# Patient Record
Sex: MALE | Race: WHITE | NOT HISPANIC OR LATINO | Employment: FULL TIME | URBAN - METROPOLITAN AREA
[De-identification: names, ages, dates, MRNs, and addresses within clinical notes are randomized per-mention and may not be internally consistent; named-entity substitution may affect disease eponyms.]

---

## 2023-03-30 LAB
ALANINE AMINOTRANSFERASE (SGPT) (U/L) IN SER/PLAS: 11 U/L (ref 10–52)
ALBUMIN (G/DL) IN SER/PLAS: 4.7 G/DL (ref 3.4–5)
ALKALINE PHOSPHATASE (U/L) IN SER/PLAS: 64 U/L (ref 33–120)
ANION GAP IN SER/PLAS: 9 MMOL/L (ref 10–20)
ASPARTATE AMINOTRANSFERASE (SGOT) (U/L) IN SER/PLAS: 11 U/L (ref 9–39)
BASOPHILS (10*3/UL) IN BLOOD BY AUTOMATED COUNT: 0.05 X10E9/L (ref 0–0.1)
BASOPHILS/100 LEUKOCYTES IN BLOOD BY AUTOMATED COUNT: 1 % (ref 0–2)
BILIRUBIN TOTAL (MG/DL) IN SER/PLAS: 0.8 MG/DL (ref 0–1.2)
C REACTIVE PROTEIN (MG/L) IN SER/PLAS: 0.2 MG/DL
CALCIUM (MG/DL) IN SER/PLAS: 9.5 MG/DL (ref 8.6–10.3)
CARBON DIOXIDE, TOTAL (MMOL/L) IN SER/PLAS: 31 MMOL/L (ref 21–32)
CHLORIDE (MMOL/L) IN SER/PLAS: 99 MMOL/L (ref 98–107)
CREATININE (MG/DL) IN SER/PLAS: 0.87 MG/DL (ref 0.5–1.3)
EOSINOPHILS (10*3/UL) IN BLOOD BY AUTOMATED COUNT: 0.05 X10E9/L (ref 0–0.7)
EOSINOPHILS/100 LEUKOCYTES IN BLOOD BY AUTOMATED COUNT: 1 % (ref 0–6)
ERYTHROCYTE DISTRIBUTION WIDTH (RATIO) BY AUTOMATED COUNT: 13.1 % (ref 11.5–14.5)
ERYTHROCYTE MEAN CORPUSCULAR HEMOGLOBIN CONCENTRATION (G/DL) BY AUTOMATED: 34.7 G/DL (ref 32–36)
ERYTHROCYTE MEAN CORPUSCULAR VOLUME (FL) BY AUTOMATED COUNT: 92 FL (ref 80–100)
ERYTHROCYTES (10*6/UL) IN BLOOD BY AUTOMATED COUNT: 4.68 X10E12/L (ref 4.5–5.9)
GFR MALE: >90 ML/MIN/1.73M2
GLUCOSE (MG/DL) IN SER/PLAS: 89 MG/DL (ref 74–99)
HEMATOCRIT (%) IN BLOOD BY AUTOMATED COUNT: 43 % (ref 41–52)
HEMOGLOBIN (G/DL) IN BLOOD: 14.9 G/DL (ref 13.5–17.5)
IMMATURE GRANULOCYTES/100 LEUKOCYTES IN BLOOD BY AUTOMATED COUNT: 0.2 % (ref 0–0.9)
LEUKOCYTES (10*3/UL) IN BLOOD BY AUTOMATED COUNT: 5.2 X10E9/L (ref 4.4–11.3)
LYMPHOCYTES (10*3/UL) IN BLOOD BY AUTOMATED COUNT: 1.65 X10E9/L (ref 1.2–4.8)
LYMPHOCYTES/100 LEUKOCYTES IN BLOOD BY AUTOMATED COUNT: 31.9 % (ref 13–44)
MONOCYTES (10*3/UL) IN BLOOD BY AUTOMATED COUNT: 0.6 X10E9/L (ref 0.1–1)
MONOCYTES/100 LEUKOCYTES IN BLOOD BY AUTOMATED COUNT: 11.6 % (ref 2–10)
NEUTROPHILS (10*3/UL) IN BLOOD BY AUTOMATED COUNT: 2.81 X10E9/L (ref 1.2–7.7)
NEUTROPHILS/100 LEUKOCYTES IN BLOOD BY AUTOMATED COUNT: 54.3 % (ref 40–80)
NRBC (PER 100 WBCS) BY AUTOMATED COUNT: 0 /100 WBC (ref 0–0)
PLATELETS (10*3/UL) IN BLOOD AUTOMATED COUNT: 190 X10E9/L (ref 150–450)
POTASSIUM (MMOL/L) IN SER/PLAS: 4.1 MMOL/L (ref 3.5–5.3)
PROTEIN TOTAL: 8.3 G/DL (ref 6.4–8.2)
SODIUM (MMOL/L) IN SER/PLAS: 135 MMOL/L (ref 136–145)
UREA NITROGEN (MG/DL) IN SER/PLAS: 17 MG/DL (ref 6–23)

## 2023-04-01 LAB
C. DIFFICILE TOXIN, PCR: NOT DETECTED
CAMPYLOBACTER GP: NOT DETECTED
NOROVIRUS GI/GII: NOT DETECTED
ROTAVIRUS A: NOT DETECTED
SALMONELLA SP.: NOT DETECTED
SHIGA TOXIN 1: NOT DETECTED
SHIGA TOXIN 2: NOT DETECTED
SHIGELLA SP.: NOT DETECTED
VIBRIO GRP.: NOT DETECTED
YERSINIA ENTEROCOLITICA: NOT DETECTED

## 2023-04-05 LAB — CALPROTECTIN, STOOL: 2270 UG/G

## 2023-05-17 ENCOUNTER — HOSPITAL ENCOUNTER (OUTPATIENT)
Dept: DATA CONVERSION | Facility: HOSPITAL | Age: 53
End: 2023-05-17
Attending: INTERNAL MEDICINE | Admitting: INTERNAL MEDICINE
Payer: COMMERCIAL

## 2023-05-17 DIAGNOSIS — Z98.0 INTESTINAL BYPASS AND ANASTOMOSIS STATUS: ICD-10-CM

## 2023-05-17 DIAGNOSIS — K50.90 CROHN'S DISEASE, UNSPECIFIED, WITHOUT COMPLICATIONS (MULTI): ICD-10-CM

## 2023-05-17 DIAGNOSIS — K64.4 RESIDUAL HEMORRHOIDAL SKIN TAGS: ICD-10-CM

## 2023-05-17 DIAGNOSIS — K50.80 CROHN'S DISEASE OF BOTH SMALL AND LARGE INTESTINE WITHOUT COMPLICATIONS (MULTI): ICD-10-CM

## 2023-05-30 LAB
COMPLETE PATHOLOGY REPORT: NORMAL
CONVERTED CLINICAL DIAGNOSIS-HISTORY: NORMAL
CONVERTED FINAL DIAGNOSIS: NORMAL
CONVERTED FINAL REPORT PDF LINK TO COPY AND PASTE: NORMAL
CONVERTED GROSS DESCRIPTION: NORMAL
CONVERTED MICROSCOPIC DESCRIPTION: NORMAL

## 2023-06-01 LAB
HEPATITIS B VIRUS CORE AB (PRESENCE) IN SER/PLAS BY IMM: NONREACTIVE
HEPATITIS B VIRUS SURFACE AB (MIU/ML) IN SERUM: <3.1 MIU/ML
HEPATITIS B VIRUS SURFACE AG PRESENCE IN SERUM: NONREACTIVE
HEPATITIS C VIRUS AB PRESENCE IN SERUM: NONREACTIVE

## 2023-06-03 LAB
NIL(NEG) CONTROL SPOT COUNT: NORMAL
PANEL A SPOT COUNT: 0
PANEL B SPOT COUNT: 0
POS CONTROL SPOT COUNT: NORMAL
T-SPOT. TB INTERPRETATION: NEGATIVE

## 2023-08-04 ENCOUNTER — OFFICE VISIT (OUTPATIENT)
Dept: PRIMARY CARE | Facility: CLINIC | Age: 53
End: 2023-08-04
Payer: COMMERCIAL

## 2023-08-04 VITALS — DIASTOLIC BLOOD PRESSURE: 72 MMHG | SYSTOLIC BLOOD PRESSURE: 124 MMHG | WEIGHT: 164 LBS | BODY MASS INDEX: 21.06 KG/M2

## 2023-08-04 DIAGNOSIS — J06.9 UPPER RESPIRATORY TRACT INFECTION, UNSPECIFIED TYPE: Primary | ICD-10-CM

## 2023-08-04 DIAGNOSIS — M79.671 RIGHT FOOT PAIN: ICD-10-CM

## 2023-08-04 PROBLEM — J33.9 NASAL POLYPS: Status: ACTIVE | Noted: 2023-08-04

## 2023-08-04 PROBLEM — I86.8 RETICULAR VEIN: Status: RESOLVED | Noted: 2023-08-04 | Resolved: 2023-08-04

## 2023-08-04 PROBLEM — D80.6 DEFICIENCY OF ANTI-PNEUMOCOCCAL POLYSACCHARIDE ANTIBODY (MULTI): Status: ACTIVE | Noted: 2023-08-04

## 2023-08-04 PROBLEM — J01.41 RECURRENT PANSINUSITIS: Status: RESOLVED | Noted: 2023-08-04 | Resolved: 2023-08-04

## 2023-08-04 PROBLEM — K50.80 CROHN'S DISEASE OF BOTH SMALL AND LARGE INTESTINE WITHOUT COMPLICATION (MULTI): Status: ACTIVE | Noted: 2023-08-04

## 2023-08-04 PROBLEM — J34.3 NASAL TURBINATE HYPERTROPHY: Status: ACTIVE | Noted: 2023-08-04

## 2023-08-04 PROBLEM — K21.9 GERD (GASTROESOPHAGEAL REFLUX DISEASE): Status: ACTIVE | Noted: 2023-08-04

## 2023-08-04 PROBLEM — G47.33 OBSTRUCTIVE SLEEP APNEA OF ADULT: Status: ACTIVE | Noted: 2023-08-04

## 2023-08-04 PROBLEM — J34.1 RETENTION CYST OF PARANASAL SINUS: Status: RESOLVED | Noted: 2023-08-04 | Resolved: 2023-08-04

## 2023-08-04 PROBLEM — J01.90 ACUTE SINUSITIS: Status: RESOLVED | Noted: 2023-08-04 | Resolved: 2023-08-04

## 2023-08-04 PROBLEM — M54.50 LOW BACK PAIN: Status: ACTIVE | Noted: 2023-08-04

## 2023-08-04 PROBLEM — J34.89 NASAL OBSTRUCTION: Status: RESOLVED | Noted: 2023-08-04 | Resolved: 2023-08-04

## 2023-08-04 PROBLEM — J30.9 ALLERGIC RHINITIS: Status: ACTIVE | Noted: 2023-08-04

## 2023-08-04 PROBLEM — M54.16 LUMBAR RADICULAR PAIN: Status: ACTIVE | Noted: 2023-08-04

## 2023-08-04 PROBLEM — B35.1 ONYCHOMYCOSIS OF TOENAIL: Status: RESOLVED | Noted: 2023-08-04 | Resolved: 2023-08-04

## 2023-08-04 PROBLEM — G47.00 INSOMNIA: Status: ACTIVE | Noted: 2023-08-04

## 2023-08-04 PROBLEM — J45.909 AB (ASTHMATIC BRONCHITIS) (HHS-HCC): Status: RESOLVED | Noted: 2023-08-04 | Resolved: 2023-08-04

## 2023-08-04 PROBLEM — I78.1 SPIDER VEINS: Status: ACTIVE | Noted: 2023-08-04

## 2023-08-04 PROBLEM — I83.93 VARICOSE VEINS OF LEGS: Status: ACTIVE | Noted: 2023-08-04

## 2023-08-04 PROBLEM — K62.89 PROCTITIS: Status: ACTIVE | Noted: 2023-08-04

## 2023-08-04 PROBLEM — J32.2 CHRONIC ETHMOIDAL SINUSITIS: Status: ACTIVE | Noted: 2023-08-04

## 2023-08-04 PROBLEM — R10.30 ABDOMINAL PAIN, LOWER: Status: RESOLVED | Noted: 2023-08-04 | Resolved: 2023-08-04

## 2023-08-04 PROBLEM — J32.0 CHRONIC SINUSITIS OF BOTH MAXILLARY SINUSES: Status: ACTIVE | Noted: 2023-08-04

## 2023-08-04 LAB
ALANINE AMINOTRANSFERASE (SGPT) (U/L) IN SER/PLAS: 8 U/L (ref 10–52)
ALBUMIN (G/DL) IN SER/PLAS: 4.4 G/DL (ref 3.4–5)
ALKALINE PHOSPHATASE (U/L) IN SER/PLAS: 58 U/L (ref 33–120)
ASPARTATE AMINOTRANSFERASE (SGOT) (U/L) IN SER/PLAS: 12 U/L (ref 9–39)
BASOPHILS (10*3/UL) IN BLOOD BY AUTOMATED COUNT: 0.08 X10E9/L (ref 0–0.1)
BASOPHILS/100 LEUKOCYTES IN BLOOD BY AUTOMATED COUNT: 1.5 % (ref 0–2)
BILIRUBIN DIRECT (MG/DL) IN SER/PLAS: 0.2 MG/DL (ref 0–0.3)
BILIRUBIN TOTAL (MG/DL) IN SER/PLAS: 1 MG/DL (ref 0–1.2)
EOSINOPHILS (10*3/UL) IN BLOOD BY AUTOMATED COUNT: 0.14 X10E9/L (ref 0–0.7)
EOSINOPHILS/100 LEUKOCYTES IN BLOOD BY AUTOMATED COUNT: 2.7 % (ref 0–6)
ERYTHROCYTE DISTRIBUTION WIDTH (RATIO) BY AUTOMATED COUNT: 13.1 % (ref 11.5–14.5)
ERYTHROCYTE MEAN CORPUSCULAR HEMOGLOBIN CONCENTRATION (G/DL) BY AUTOMATED: 34 G/DL (ref 32–36)
ERYTHROCYTE MEAN CORPUSCULAR VOLUME (FL) BY AUTOMATED COUNT: 94 FL (ref 80–100)
ERYTHROCYTES (10*6/UL) IN BLOOD BY AUTOMATED COUNT: 4.52 X10E12/L (ref 4.5–5.9)
HEMATOCRIT (%) IN BLOOD BY AUTOMATED COUNT: 42.3 % (ref 41–52)
HEMOGLOBIN (G/DL) IN BLOOD: 14.4 G/DL (ref 13.5–17.5)
IMMATURE GRANULOCYTES/100 LEUKOCYTES IN BLOOD BY AUTOMATED COUNT: 0.4 % (ref 0–0.9)
LEUKOCYTES (10*3/UL) IN BLOOD BY AUTOMATED COUNT: 5.3 X10E9/L (ref 4.4–11.3)
LYMPHOCYTES (10*3/UL) IN BLOOD BY AUTOMATED COUNT: 1.54 X10E9/L (ref 1.2–4.8)
LYMPHOCYTES/100 LEUKOCYTES IN BLOOD BY AUTOMATED COUNT: 29.2 % (ref 13–44)
MONOCYTES (10*3/UL) IN BLOOD BY AUTOMATED COUNT: 0.59 X10E9/L (ref 0.1–1)
MONOCYTES/100 LEUKOCYTES IN BLOOD BY AUTOMATED COUNT: 11.2 % (ref 2–10)
NEUTROPHILS (10*3/UL) IN BLOOD BY AUTOMATED COUNT: 2.91 X10E9/L (ref 1.2–7.7)
NEUTROPHILS/100 LEUKOCYTES IN BLOOD BY AUTOMATED COUNT: 55 % (ref 40–80)
PLATELETS (10*3/UL) IN BLOOD AUTOMATED COUNT: 214 X10E9/L (ref 150–450)
PROTEIN TOTAL: 8.2 G/DL (ref 6.4–8.2)

## 2023-08-04 PROCEDURE — 99213 OFFICE O/P EST LOW 20 MIN: CPT | Performed by: FAMILY MEDICINE

## 2023-08-04 RX ORDER — AZITHROMYCIN 250 MG/1
TABLET, FILM COATED ORAL
Qty: 6 TABLET | Refills: 0 | Status: SHIPPED | OUTPATIENT
Start: 2023-08-04 | End: 2023-08-09

## 2023-08-04 RX ORDER — ADALIMUMAB 40MG/0.4ML
40 KIT SUBCUTANEOUS
COMMUNITY
Start: 2023-05-18 | End: 2023-12-08

## 2023-08-04 ASSESSMENT — PATIENT HEALTH QUESTIONNAIRE - PHQ9
2. FEELING DOWN, DEPRESSED OR HOPELESS: NOT AT ALL
SUM OF ALL RESPONSES TO PHQ9 QUESTIONS 1 AND 2: 0
1. LITTLE INTEREST OR PLEASURE IN DOING THINGS: NOT AT ALL

## 2023-08-04 NOTE — PROGRESS NOTES
Chief complaint:   Chief Complaint   Patient presents with    chest congestion     Yellow/green phlegm 1 week    Sore Throat     1 week    Swollen Glands     1 week     post nasal drip     1 week    Foot Pain     Pain in ball of right foot for 1 week       HPI:  Macario Jimenez is a 52 y.o. male who presents for evaluation of illness.    He had a trip to [facility] last month. When he returned 7/16/2023 he developed URI sx which has acutely worsened in the past 4 days. With sore throat and swollen glands. No fevers, chill, myalgias. He has a cough and rhinorrhea which is thick and yellow green in color. No SOB. He had a slight ear ache in his right ear for a few days. He saw an ENT last week.     He is having a pain in the bottom of his foot right. He was able to walk in [place] without trouble. Since returning has had some pain. No new shoes.     He recently started Humira for a Crohns flare 6/9/2023 was his first dose.    Physical exam:  /72   Wt 74.4 kg (164 lb)   BMI 21.06 kg/m²   General: NAD, well appearing male  Heart: RRR, no mumur appreciated  Lungs: CTAB, no wheezes, rales, rhonchi  Abdomen: soft, non tender, normoactive BS, no organomegaly  Extremities: No LE edema    Assessment/Plan   Problem List Items Addressed This Visit    None  Visit Diagnoses       Upper respiratory tract infection, unspecified type    -  Primary    Relevant Medications    azithromycin (Zithromax) 250 mg tablet    Right foot pain            Follow up 3-5 days if URI not resolved. Follow up 1-2 weeks if foot pain has not improved    Jayla Escoto,

## 2023-08-08 LAB
BASOPHILS (10*3/UL) IN BLOOD BY AUTOMATED COUNT: 0.08 X10E9/L (ref 0–0.1)
BASOPHILS/100 LEUKOCYTES IN BLOOD BY AUTOMATED COUNT: 1.5 % (ref 0–2)
EOSINOPHILS (10*3/UL) IN BLOOD BY AUTOMATED COUNT: 0.13 X10E9/L (ref 0–0.7)
EOSINOPHILS/100 LEUKOCYTES IN BLOOD BY AUTOMATED COUNT: 2.4 % (ref 0–6)
ERYTHROCYTE DISTRIBUTION WIDTH (RATIO) BY AUTOMATED COUNT: 12.9 % (ref 11.5–14.5)
ERYTHROCYTE MEAN CORPUSCULAR HEMOGLOBIN CONCENTRATION (G/DL) BY AUTOMATED: 33.6 G/DL (ref 32–36)
ERYTHROCYTE MEAN CORPUSCULAR VOLUME (FL) BY AUTOMATED COUNT: 93 FL (ref 80–100)
ERYTHROCYTES (10*6/UL) IN BLOOD BY AUTOMATED COUNT: 4.51 X10E12/L (ref 4.5–5.9)
HEMATOCRIT (%) IN BLOOD BY AUTOMATED COUNT: 42 % (ref 41–52)
HEMOGLOBIN (G/DL) IN BLOOD: 14.1 G/DL (ref 13.5–17.5)
IGA (MG/DL) IN SER/PLAS: 249 MG/DL (ref 70–400)
IGG (MG/DL) IN SER/PLAS: 1860 MG/DL (ref 700–1600)
IGM (MG/DL) IN SER/PLAS: 85 MG/DL (ref 40–230)
IMMATURE GRANULOCYTES/100 LEUKOCYTES IN BLOOD BY AUTOMATED COUNT: 0.4 % (ref 0–0.9)
LEUKOCYTES (10*3/UL) IN BLOOD BY AUTOMATED COUNT: 5.4 X10E9/L (ref 4.4–11.3)
LYMPHOCYTES (10*3/UL) IN BLOOD BY AUTOMATED COUNT: 1.47 X10E9/L (ref 1.2–4.8)
LYMPHOCYTES/100 LEUKOCYTES IN BLOOD BY AUTOMATED COUNT: 27.2 % (ref 13–44)
MONOCYTES (10*3/UL) IN BLOOD BY AUTOMATED COUNT: 0.55 X10E9/L (ref 0.1–1)
MONOCYTES/100 LEUKOCYTES IN BLOOD BY AUTOMATED COUNT: 10.2 % (ref 2–10)
NEUTROPHILS (10*3/UL) IN BLOOD BY AUTOMATED COUNT: 3.15 X10E9/L (ref 1.2–7.7)
NEUTROPHILS/100 LEUKOCYTES IN BLOOD BY AUTOMATED COUNT: 58.3 % (ref 40–80)
PLATELETS (10*3/UL) IN BLOOD AUTOMATED COUNT: 208 X10E9/L (ref 150–450)

## 2023-08-09 LAB — IMMUNOCAP IGE: <2 KU/L (ref 0–214)

## 2023-08-10 LAB
AB TO ADALIMUMAB(ATA) CONC.: <1.7 U/ML
ADA RESULTS: ABNORMAL
ADALIMUMAB(ADA) CONC.: 5.3 UG/ML
ALBUMIN ELP: 4.3 G/DL (ref 3.4–5)
ALPHA 1: 0.3 G/DL (ref 0.2–0.6)
ALPHA 2: 0.6 G/DL (ref 0.4–1.1)
BETA: 0.9 G/DL (ref 0.5–1.2)
CALPROTECTIN, STOOL: 1840 UG/G
CD19 ABSOLUTE: 0.19 X10E9/L (ref 0.07–0.91)
CD19%: 13 % (ref 6–19)
CD3 ABSOLUTE: 1.09 X10E9/L (ref 0.71–4.18)
CD3%: 74 % (ref 59–87)
CD3+CD4+ ABSOLUTE: 0.82 X10E9/L (ref 0.35–2.74)
CD3+CD4-CD8-%: 7 % (ref 0–6)
CD3+CD8+ ABSOLUTE: 0.22 X10E9/L (ref 0.08–1.49)
CD3-CD16+CD56+ ABSOLUTE: 0.19 X10E9/L (ref 0–0.86)
CD3-CD16+CD56+%: 13 % (ref 0–18)
CD4/CD8 RATIO: 3.73 (ref 1–3.5)
CD45%: 100 %
CP CD3+CD4+%: 56 % (ref 29–57)
CP CD3+CD8+%: 15 % (ref 7–31)
FMETH: ABNORMAL
FSIT1: ABNORMAL
GAMMA GLOBULIN: 1.9 G/DL (ref 0.5–1.4)
MARKER INTERPRETATION: ABNORMAL
PATH REVIEW-SERUM PROTEIN ELECTROPHORESIS: NORMAL
PROTEIN ELECTROPHORESIS INTERPRETATION: ABNORMAL
PROTEIN TOTAL: 8 G/DL (ref 6.4–8.2)
PV191: NORMAL

## 2023-08-14 LAB
DIPHTHERIA ANTIBODY: 0.3 IU/ML
HAEMOPHILUS INFLUENZAE B AB IGG: 0 UG/ML
PNEUMO SEROTYPE INTERPRETATION: NORMAL
SEROTYPE 1, VIRC: 0.39 UG/ML
SEROTYPE 10A(34), VIRC: 1.31 UG/ML
SEROTYPE 11A(43), VIRC: 0.78 UG/ML
SEROTYPE 12F, VIRC: 0.77 UG/ML
SEROTYPE 14, VIRC: 9.4 UG/ML
SEROTYPE 15B(54), VIRC: 0.64 UG/ML
SEROTYPE 17F, VIRC: 0.63 UG/ML
SEROTYPE 18C(56), VIRC: 14.23 UG/ML
SEROTYPE 19A(57), VIRC: 2.68 UG/ML
SEROTYPE 19F, VIRC: 0.67 UG/ML
SEROTYPE 2, VIRC: 2.38 UG/ML
SEROTYPE 20, VIRC: 1.1 UG/ML
SEROTYPE 22F, VIRC: 1.64 UG/ML
SEROTYPE 23F, VIRC: 1.82 UG/ML
SEROTYPE 3, VIRC: 0.64 UG/ML
SEROTYPE 33F(70), VIRC: 0.19 UG/ML
SEROTYPE 4, VIRC: 0.41 UG/ML
SEROTYPE 5, VIRC: 3.26 UG/ML
SEROTYPE 6B(26), VIRC: 2.15 UG/ML
SEROTYPE 7F(51), VIRC: 1.76 UG/ML
SEROTYPE 8, VIRC: 0.6 UG/ML
SEROTYPE 9N, VIRC: 0.22 UG/ML
SEROTYPE 9V(68), VIRC: 0.33 UG/ML
TETANUS AB IGG: 2.7 IU/ML

## 2023-08-15 LAB
ALBUMIN ELP: 4.1 G/DL (ref 3.4–5)
ALBUMIN/PROTEIN TOTAL (%) IN URINE BY ELECTROPHORESIS: 26.1 %
ALPHA 1 GLOBULIN/PROTEIN TOTAL (%) IN URINE BY ELECTROPHORESIS: 11.4 %
ALPHA 1: 0.3 G/DL (ref 0.2–0.6)
ALPHA 2 GLOBULIN/PROTEIN TOTAL (%) IN URINE BY ELECTROPHORESIS: 21.5 %
ALPHA 2: 0.5 G/DL (ref 0.4–1.1)
BETA GLOBULIN/PROTEIN TOTAL (%) IN URINE BY ELECTROPHORESIS: 25.3 %
BETA: 0.8 G/DL (ref 0.5–1.2)
GAMMA GLOBULIN/PROTEIN TOTAL (%) IN URINE BY ELECTROPHORESIS: 15.7 %
GAMMA GLOBULIN: 1.7 G/DL (ref 0.5–1.4)
PATH REVIEW-SERUM PROTEIN ELECTROPHORESIS: NORMAL
PATH REVIEW-URINE PROTEIN ELECTROPHORESIS: NORMAL
PROTEIN (MG/DL) IN URINE: 7 MG/DL (ref 5–25)
PROTEIN ELECTROPHORESIS INTERPRETATION: ABNORMAL
PROTEIN TOTAL: 7.4 G/DL (ref 6.4–8.2)
UPEP INTERPRETATION: NORMAL

## 2023-08-22 LAB — MANNAN BINDING LECTIN: <40 NG/ML

## 2023-10-09 ENCOUNTER — TELEPHONE (OUTPATIENT)
Dept: ORTHOPEDIC SURGERY | Facility: CLINIC | Age: 53
End: 2023-10-09
Payer: COMMERCIAL

## 2023-10-09 DIAGNOSIS — M54.50 LOW BACK PAIN, UNSPECIFIED BACK PAIN LATERALITY, UNSPECIFIED CHRONICITY, UNSPECIFIED WHETHER SCIATICA PRESENT: ICD-10-CM

## 2023-10-10 ENCOUNTER — OFFICE VISIT (OUTPATIENT)
Dept: ORTHOPEDIC SURGERY | Facility: CLINIC | Age: 53
End: 2023-10-10
Payer: COMMERCIAL

## 2023-10-10 VITALS — WEIGHT: 160 LBS | HEIGHT: 74 IN | BODY MASS INDEX: 20.53 KG/M2

## 2023-10-10 DIAGNOSIS — M54.16 LUMBAR RADICULOPATHY: Primary | ICD-10-CM

## 2023-10-10 DIAGNOSIS — K50.80 CROHN'S DISEASE OF BOTH SMALL AND LARGE INTESTINE WITHOUT COMPLICATION (MULTI): ICD-10-CM

## 2023-10-10 DIAGNOSIS — M54.50 LOW BACK PAIN, UNSPECIFIED BACK PAIN LATERALITY, UNSPECIFIED CHRONICITY, UNSPECIFIED WHETHER SCIATICA PRESENT: ICD-10-CM

## 2023-10-10 PROCEDURE — 99213 OFFICE O/P EST LOW 20 MIN: CPT | Performed by: ORTHOPAEDIC SURGERY

## 2023-10-10 PROCEDURE — 1036F TOBACCO NON-USER: CPT | Performed by: ORTHOPAEDIC SURGERY

## 2023-10-10 RX ORDER — ADALIMUMAB-ADAZ 40 MG/.4ML
40 INJECTION, SOLUTION SUBCUTANEOUS
Qty: 4 ML | Refills: 11 | Status: SHIPPED | OUTPATIENT
Start: 2023-10-10 | End: 2023-10-19 | Stop reason: SDUPTHER

## 2023-10-10 NOTE — PROGRESS NOTES
Macario Jimenez is a 52 y.o. male who presents for Follow-up of the Lower Back (F/U after therapy says that he is still having pain/MRI was denied).    HPI:  52-year-old male who presents for follow-up.  He now has done 12 visits of physical therapy over 6 weeks and is still having left leg radicular numbness and tingling.  His MRI was denied but he is now done 6 weeks of therapy without improvement.  He denies any fever chills nausea vomiting night sweats he has no bowel or bladder complaints.    Physical exam:  Well-nourished, well kept.  Good perfusion to the extremities ×4.  Radial and dorsalis pedis pulses 2+.  Capillary refill to all 4 digits brisk.  No distal edema x 4.  No lymphangitis or lymphadenopathy in the examined extremities.  Gait normal.  Can walk on heels and toes.  Examination of the neck reveals no swelling, step-off, or point tenderness.  Range of motion with flexion, extension, side bending and rotation is well maintained without crepitance, instability, or exacerbation of pain.  Strength is within normal limits.  Thoracic spine is nontender.  Examination of the back shows no tenderness in the paraspinous musculature.  There is no decreased range of motion in all directions.  There is good strength and no instability.  Examination of the lower extremities reveals no point tenderness, swelling, or deformity.  Range of motion of the hips, knees, and ankles are full without crepitance, instability, or exacerbation of pain.  Strength is 5/5 throughout.  No redness, abrasions, or lesions on all 4 extremities, head and neck, or trunk.  Gross sensation intact in the extremities ×4.  Deep tendon reflexes 2+ and symmetric bilaterally.  Neeraj, clonus, and Babinski were negative.  Straight leg raise negative.  Affect normal.  Alert and oriented ×3.  Coordination normal.    Assessment:  52-year-old gentleman here for follow-up.  He is done 12 visits of physical therapy over 6 weeks.  He is still having a  little bit of mild left low back pain but mostly numbness and tingling throughout the entire left leg down to the foot.  It bothers him when he stands and walks.  Physical therapy has not helped this.  He is still not happy with his level of improvement.  He would like to have us resubmit for the MRI.    Plan:    For complete plan and/or surgical details, please refer to Dr. Smith's portion of this split dictation.      In a face-to-face encounter, I performed a history and physical examination, discussed pertinent diagnostic studies if indicated, and discussed diagnosis and management strategies with both the patient and the midlevel provider.  I reviewed the midlevel's note and agree with the documented findings and plan of care.    Physical therapy has not helped.  He has done 11 visits of the department as well as a lot of exercises at home with an iPad that the insurance company sent him where he is being monitored doing home therapy as well.  He still has numbness and pain in his left leg and cannot walk for more than 5 to 10 minutes before he has to sit down and rest and then his pain goes away.  He has a history of a large scoliosis surgery he was younger.    Plan: We will get a get him into an MRI and also have him get into a pain management doctor in case he may need procedures with them.  And we will see him back after the MRI.

## 2023-10-12 ENCOUNTER — TELEPHONE (OUTPATIENT)
Dept: OTOLARYNGOLOGY | Facility: CLINIC | Age: 53
End: 2023-10-12
Payer: COMMERCIAL

## 2023-10-12 NOTE — TELEPHONE ENCOUNTER
Patient called into the office today with concerns of congestion. He just finished a round of Augmentin for a sinus infection that was prescribed on 9/21/23. The discolored drainage has since resolved. He is asking for a prescription for Budesonide, as this has helped in the past. Discussed with Dr. Rosenthal, per verbal order Budesonide irrigations. Nasal irrigations ordered. Order [facility] per patient request for Budesonide 0.5mg once a day per nasal irrigation for 30 days with 3 refills, per his prescription history. Patient notified compounding pharmacy will call patient for further instruction and to obtain additional information. Patient instructed to call with any further questions.

## 2023-10-13 ENCOUNTER — LAB (OUTPATIENT)
Dept: LAB | Facility: LAB | Age: 53
End: 2023-10-13
Payer: COMMERCIAL

## 2023-10-13 DIAGNOSIS — K50.80 CROHN'S DISEASE OF BOTH SMALL AND LARGE INTESTINE WITHOUT COMPLICATIONS (MULTI): ICD-10-CM

## 2023-10-13 DIAGNOSIS — J01.41 ACUTE RECURRENT PANSINUSITIS: Primary | ICD-10-CM

## 2023-10-13 DIAGNOSIS — R10.30 LOWER ABDOMINAL PAIN, UNSPECIFIED: ICD-10-CM

## 2023-10-13 DIAGNOSIS — R10.30 LOWER ABDOMINAL PAIN, UNSPECIFIED: Primary | ICD-10-CM

## 2023-10-13 PROCEDURE — 36415 COLL VENOUS BLD VENIPUNCTURE: CPT

## 2023-10-13 PROCEDURE — 80145 DRUG ASSAY ADALIMUMAB: CPT

## 2023-10-13 PROCEDURE — 83993 ASSAY FOR CALPROTECTIN FECAL: CPT

## 2023-10-13 PROCEDURE — 86317 IMMUNOASSAY INFECTIOUS AGENT: CPT

## 2023-10-16 LAB
ADALIMUMAB NAB TITR SER BIOASSAY: NOT DETECTED {TITER}
ADALIMUMAB SERPL-MCNC: 18.56 UG/ML
PATHOLOGY STUDY: NORMAL

## 2023-10-17 LAB
CALPROTECTIN STL-MCNT: 1520 UG/G
S PN DA SERO 19F IGG SER-MCNC: 1.77 UG/ML
S PNEUM DA 1 IGG SER-MCNC: 0.93 UG/ML
S PNEUM DA 10A IGG SER-MCNC: 0.63 UG/ML
S PNEUM DA 11A IGG SER-MCNC: 0.27 UG/ML
S PNEUM DA 12F IGG SER-MCNC: 0.24 UG/ML
S PNEUM DA 14 IGG SER-MCNC: 3.93 UG/ML
S PNEUM DA 15B IGG SER-MCNC: 1.33 UG/ML
S PNEUM DA 17F IGG SER-MCNC: 0.6 UG/ML
S PNEUM DA 18C IGG SER-MCNC: >11.15 UG/ML
S PNEUM DA 19A IGG SER-MCNC: 1.08 UG/ML
S PNEUM DA 2 IGG SER-MCNC: 4.86 UG/ML
S PNEUM DA 20A IGG SER-MCNC: 0.34 UG/ML
S PNEUM DA 22F IGG SER-MCNC: 0.51 UG/ML
S PNEUM DA 23F IGG SER-MCNC: 1.57 UG/ML
S PNEUM DA 3 IGG SER-MCNC: 1.85 UG/ML
S PNEUM DA 33F IGG SER-MCNC: 0.22 UG/ML
S PNEUM DA 4 IGG SER-MCNC: 0.43 UG/ML
S PNEUM DA 5 IGG SER-MCNC: 2.72 UG/ML
S PNEUM DA 6B IGG SER-MCNC: 0.9 UG/ML
S PNEUM DA 7F IGG SER-MCNC: 4.33 UG/ML
S PNEUM DA 8 IGG SER-MCNC: 0.31 UG/ML
S PNEUM DA 9N IGG SER-MCNC: 0.27 UG/ML
S PNEUM DA 9V IGG SER-MCNC: 0.39 UG/ML
S PNEUM SEROTYPE IGG SER-IMP: NORMAL

## 2023-10-19 DIAGNOSIS — K50.80 CROHN'S DISEASE OF BOTH SMALL AND LARGE INTESTINE WITHOUT COMPLICATION (MULTI): ICD-10-CM

## 2023-10-21 ENCOUNTER — TELEPHONE (OUTPATIENT)
Dept: PAIN MEDICINE | Facility: CLINIC | Age: 53
End: 2023-10-21
Payer: COMMERCIAL

## 2023-10-25 RX ORDER — ADALIMUMAB-ADAZ 40 MG/.4ML
40 INJECTION, SOLUTION SUBCUTANEOUS
Qty: 4 ML | Refills: 11 | Status: SHIPPED | OUTPATIENT
Start: 2023-10-25 | End: 2024-01-09 | Stop reason: SDUPTHER

## 2023-10-30 ENCOUNTER — APPOINTMENT (OUTPATIENT)
Dept: PAIN MEDICINE | Facility: CLINIC | Age: 53
End: 2023-10-30
Payer: COMMERCIAL

## 2023-11-03 ENCOUNTER — HOSPITAL ENCOUNTER (OUTPATIENT)
Dept: RADIOLOGY | Facility: CLINIC | Age: 53
Discharge: HOME | End: 2023-11-03
Payer: COMMERCIAL

## 2023-11-03 DIAGNOSIS — M54.50 LOW BACK PAIN, UNSPECIFIED BACK PAIN LATERALITY, UNSPECIFIED CHRONICITY, UNSPECIFIED WHETHER SCIATICA PRESENT: ICD-10-CM

## 2023-11-03 DIAGNOSIS — M54.16 LUMBAR RADICULOPATHY: ICD-10-CM

## 2023-11-03 PROCEDURE — 72148 MRI LUMBAR SPINE W/O DYE: CPT

## 2023-11-03 PROCEDURE — 72148 MRI LUMBAR SPINE W/O DYE: CPT | Performed by: RADIOLOGY

## 2023-11-06 ENCOUNTER — OFFICE VISIT (OUTPATIENT)
Dept: PAIN MEDICINE | Facility: CLINIC | Age: 53
End: 2023-11-06
Payer: COMMERCIAL

## 2023-11-06 VITALS — DIASTOLIC BLOOD PRESSURE: 71 MMHG | RESPIRATION RATE: 20 BRPM | SYSTOLIC BLOOD PRESSURE: 123 MMHG | HEART RATE: 103 BPM

## 2023-11-06 DIAGNOSIS — M54.16 LUMBAR RADICULOPATHY: Primary | ICD-10-CM

## 2023-11-06 PROCEDURE — 1036F TOBACCO NON-USER: CPT | Performed by: PAIN MEDICINE

## 2023-11-06 PROCEDURE — 99214 OFFICE O/P EST MOD 30 MIN: CPT | Performed by: PAIN MEDICINE

## 2023-11-06 PROCEDURE — 99204 OFFICE O/P NEW MOD 45 MIN: CPT | Performed by: PAIN MEDICINE

## 2023-11-06 ASSESSMENT — PAIN SCALES - GENERAL: PAINLEVEL: 5

## 2023-11-06 NOTE — H&P
History Of Present Illness  Macario Jimenez is a 53 y.o. male presenting with back pain   Patient has a history of chronic pain in his back that has been gradually worsening over the past few months . The pain is located in his lower back. Patient describes pain as a throbbing pain that radiates too is hip thru the leg to the foot . Pain is generally a 5/10 in intensity. Pain reaches a maximal intensity of 7/10 . Pain affects his walking and standing.    patient believes that the pain started in the last few years it seems that his pain has been progressively getting worse currently radiating from the lower lumbar spine area down to the left lower extremity to just above his left knee aggravated by standing and walking alleviated by sitting.  Described as a dull steady pain.  Denies any prior injections has been using regularly Tylenol and ibuprofen and other over-the-counter nonsteroidal anti-inflammatory but he described them as not being beneficial also on oral prednisone that he use for Crohn's disease and he did not believe it helped his back pain  Patient was operated on with a fusion with Wetzel thi back in 1983.    Pain is worsened by: standing, walking     Pain is improved by: sitting, rest     Previous Treatment History     Since patient's pain began he has been evaluated by Orthopedics.  The patient has participated in structured Physical Therapy with a professional.     The following diagnostic test have been completed: MRI and Xray. Most recent imaging studies include last week.      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    Gilbert's syndrome    Iron deficiency anemia, unspecified 12/07/2013    Iron deficiency anemia    Personal history of malignant neoplasm, unspecified     History of malignant neoplasm    Personal history of other diseases of the digestive system     History of gastroesophageal reflux (GERD)    Personal history of other diseases of the nervous system and sense organs     History of sleep apnea    Personal history of other diseases of the nervous system and sense organs 04/02/2018    History of eustachian tube dysfunction    Personal history of other diseases of the respiratory system 08/04/2017    History of acute bronchitis    Personal history of other diseases of the respiratory system 04/02/2018    History of laryngitis    Recurrent pansinusitis 08/04/2023    Unspecified asthma, uncomplicated 12/07/2013    Asthma       Surgical History  Past Surgical History:   Procedure Laterality Date    APPENDECTOMY  02/27/2015    Appendectomy    NASAL SEPTUM SURGERY  02/27/2015    Nasal Septal Deviation Repair    OTHER SURGICAL HISTORY  11/16/2018    Vertebral fusion    REFRACTIVE SURGERY  02/27/2015    Corneal LASIK    SINUS SURGERY  07/13/2021    Sinus Surgery    SMALL INTESTINE SURGERY  02/27/2015    Small Bowel Resection        Social History  He reports that he has never smoked. He has never been exposed to tobacco smoke. He has never used smokeless tobacco. He reports that he does not currently use alcohol. He reports that he does not use drugs.    Family History  Family History   Problem Relation Name Age of Onset    Hodgkin's lymphoma Mother      Diabetes Father          Allergies  Patient has no known allergies.    Review of Systems   12 Systems have been reviewed as follows.   Constitutional: Fever, weight gain, weight loss, appetite change, night sweats, fatigue, chills.  Eyes : blurry, double vision, vision, loss, tearing, redness, pain, sensitivity to light,  glaucoma.  Ears, nose, mouth, and throat: Hearing loss, ringing in the ears, ear pain, nasal congestion, nasal drainage, nosebleeds, mouth, throat, irritation tooth problem.  Cardiovascular :chest pain, pressure, heart tracing,palpaitations , sweating, leg swelling, high or low blood pressure  Pulmonary: Cough, yellow or green sputum, blood and sputum, shortness of breath, wheezing  Gastrointestinal: Nause, vomiting, diarrhea, constipation, pain, blood in stool, or vomitus, heartburn, difficulty swallowing  Genitourinary: incontinence, abnormal bleeding, abnormal discharge, urinary frequency, urinary hesitancy, pain, impotence sexual problem, infection, urinary retention  Musculoskeletal: Pain, stiffness, joint, redness or warmth, arthritis, back pain, weakness, muscle wasting, sprain or fracture  Neuro: Weight weakness, dizziness, change in voice, change in taste change in vision, change in hearing, loss, or change of sensation, trouble walking, balance problems coordination problems, shaking, speech problem  Endocrine , cold or heat intolerance, blood sugar problem, weight gain or loss missed periods hot flashes, sweats, change in body hair, change in libido, increased thirst, increased urination  Heme/lymph: Swelling, bleeding, problem anemia, bruising, enlarged lymph nodes  Allergic/immunologic: H. plus nasal drip, watery itchy eyes, nasal drainage, immunosuppressed  The above, were reviewed and noted negative except as noted.    Physical Exam   Vital signs reviewed, documented in chart     General:  Appears well, does not look in any major distress  Alert    HEENT:  Head atraumatic  Eyes normal inspection  PERRL  Normal ENT inspection  No signs of dehydration    NECK:  Normal inspection  Range of motion within normal     RESPIRATORY:  No respiratory distress    CVS:  Heart rate and rhythm regular    ABDOMEN/GI  Soft  Non-tender  No distention  No organomegaly      BACK:  Normal inspection, flexion and extension  within normal limit   no tenderness upon the palpation of the facet joint  Si joints none tender to palpations     EXTREMITIES:  Non-Tender  Full ROM  Normal appearance  No Pedal edema  Power symmetrical , sensory examination preserved.    NEURO:  Alert and oriented X 3  CNS normal as tested without focal neurological deficit   Sensation normal  Motor normal  reflexes normal    PSYCH:  Mood normal  Affect normal    SKIN:  Color normal  No rash  Warm  Dry  no sign of skin marking supportive of IV drug usage /abuse.    Last Recorded Vitals  Blood pressure 123/71, pulse 103, resp. rate 20.    Relevant Results      MR lumbar spine wo IV contrast    Result Date: 11/3/2023  Interpreted By:  Gaurav Navas, STUDY: MR LUMBAR SPINE WO IV CONTRAST;  11/3/2023 11:59 am   INDICATION: Signs/Symptoms:low back pain. Low-back pain with left leg numbness.   COMPARISON: 08/05/2021   ACCESSION NUMBER(S): [id number]   ORDERING CLINICIAN: ROBY STEINBERG   TECHNIQUE: Multiplanar and multisequential MR images of the lumbar spine are performed.   FINDINGS: There is postsurgical susceptibility artifact and warm in the posterior soft tissues at the L2 level which obscures the surrounding bone and soft tissues and partially obscures the central canal and left L2-3 neural foramen.   There is levoscoliosis of the lumbar spine. The lumbar vertebral body AP alignment is within normal limits.   There is disc desiccation at multiple levels. There is disc space narrowing at L1-2 which may be congenital.   The lumbar vertebral body heights are maintained. There is no acute bone marrow edema.   The conus medullaris is of normal morphology. The tip of the conus is obscured by postsurgical artifact. The tip of the conus descends to the L2 level.   Axial images are performed from the level of the T12-L1 disc space through S1. Tarlov cyst formation is identified at S2 and larger on the left.   The L1-2 disc space level is partially obscured by  postsurgical artifact. There is no significant central canal or neural foraminal stenosis.   The L2-3 disc space level demonstrates mild bilateral facet arthrosis and ligamentum flavum hypertrophy, greater on the left. There is minimal bulging disc though no significant central canal or neural foraminal stenosis.   The L3-4 disc space level demonstrates mild bilateral facet arthrosis. There is mild bulging disc with some flattening of the anterior thecal sac to the right of midline. There is no significant central canal or neural foraminal stenosis.   The L4-5 disc space level demonstrates mild bilateral facet arthrosis and ligamentum flavum hypertrophy. There is mild bulging disc with flattening of the anterior thecal sac asymmetric to the left. There is mild disc encroachment on the caudal neural foramen.   The L5-S1 disc space level demonstrates mild to moderate facet arthrosis and ligamentum flavum hypertrophy bilaterally. There is circumferential bulging disc asymmetric to the left. There is narrowing of the left lateral recess. Disc material may contact the transiting left S1 nerve root at this level. There is disc encroachment with mild to moderate narrowing at the caudal left neural foramen. There is mild central canal narrowing.       Levoscoliosis of the lumbar spine.   Postsurgical artifact partially obscures the spinal canal and left neural foramen at the L2 level.   Mild multilevel bulging disc more pronounced at L5-S1. There is mild central canal narrowing and mild-to-moderate left-sided neural foraminal narrowing at L5-S1. There is narrowing of the left lateral recess and possible compromise of the transiting left S1 nerve root at this level.   No acute osseous abnormality.   Signed by: Gaurav Navas 11/3/2023 12:16 PM Dictation workstation:   [id number]     Raphael of the lumbar spine area dated from July 18, 2023  AP lateral flexion extension x-rays of the lumbar spine show  moderate  degenerative changes throughout the lumbar spine with disc height  loss and osteophyte formation. There is no spondylolisthesis. There  is no spondylolysis. There is no fractures. Wetzel thi is present  with a hook around the L1 lamina. Scoliosis of the thoracolumbar  spine is partially visualized and measures 35 degrees. Bony pelvis  and hips are partially visualized and show mild bilateral hip  degenerative changes. Range of motion with flexion extension is  decreased with extension.        Assessment/Plan     53 years old with history and physical examination supportive of chronic back pain status post fusion with Wetzel thi currently with the back pain with the radicular component going down to the left lower extremity with recent MRI confirming multiple pathology including some component of stenosis degenerative disc disease and lumbar spondylosis  Plan  Discussed with the patient the different modalities available for the treatment of his condition I recommended for him a lumbar epidural steroid injection to be performed under fluoroscopic guidance targeting the L4-L5 L5-S1 level to be performed under fluoroscopic guidance with injection of contrast material to confirm needle placement if the patient did not have any significant improvement with the epidural that I would recommend to rule out the facet as a major component of his pain through diagnostic medial nerve branch blocks to be performed in the lower lumbar spine area patient denies being on any blood thinners benefits and risk were discussed and he would like to proceed      The above clinical summary has been dictated with voice recognition software. It has not been proofread for grammatical errors, typographical mistakes, or other semantic inconsistencies.    Thank you for visiting our office today. It was our pleasure to take part in your healthcare.     Please do not hesitate to contact the pain clinic after your visit with any  questions or concerns at  M-F 8-4 pm       Naz Remy M.D.  Medical Director , Division of Pain Medicine [facility]   of Anesthesiology and Pain Medicine  [facility]     [facility]  [address]  [address]  [place] [address]     Office: [phone]  Fax: [phone]     Naz Remy MD

## 2023-11-06 NOTE — PROGRESS NOTES
Subjective     Macario Jimenez is a 53 y.o. year old male patient here for chronic pain management.     Patient has a history of chronic pain in his back that has been gradually worsening over the past few months . The pain is located in his lower back. Patient describes pain as a throbbing pain that radiates too is hip thru the leg to the foot . Pain is generally a 5/10 in intensity. Pain reaches a maximal intensity of 7/10 . Pain affects his walking and standing.      Pain is worsened by: standing, walking    Pain is improved by: sitting, rest    Previous Treatment History    Since patient's pain began he has been evaluated by Orthopedics.  The patient has participated in structured Physical Therapy with a professional.    The following diagnostic test have been completed: MRI and Xray. Most recent imaging studies include last week.     Pain medication treatment history:   The patient has been on the following non-narcotic medications for pain control: Tylenol        Review of Systems    Objective   Physical Exam    Assessment/Plan

## 2023-11-17 ENCOUNTER — APPOINTMENT (OUTPATIENT)
Dept: PRIMARY CARE | Facility: CLINIC | Age: 53
End: 2023-11-17
Payer: COMMERCIAL

## 2023-11-29 ENCOUNTER — OFFICE VISIT (OUTPATIENT)
Dept: PRIMARY CARE | Facility: CLINIC | Age: 53
End: 2023-11-29
Payer: COMMERCIAL

## 2023-11-29 VITALS
BODY MASS INDEX: 21.3 KG/M2 | HEIGHT: 74 IN | DIASTOLIC BLOOD PRESSURE: 68 MMHG | SYSTOLIC BLOOD PRESSURE: 118 MMHG | WEIGHT: 166 LBS

## 2023-11-29 DIAGNOSIS — Z00.00 WELLNESS EXAMINATION: Primary | ICD-10-CM

## 2023-11-29 DIAGNOSIS — M54.16 LUMBAR RADICULAR PAIN: ICD-10-CM

## 2023-11-29 DIAGNOSIS — K50.80 CROHN'S DISEASE OF BOTH SMALL AND LARGE INTESTINE WITHOUT COMPLICATIONS (MULTI): ICD-10-CM

## 2023-11-29 DIAGNOSIS — D80.6 DEFICIENCY OF ANTI-PNEUMOCOCCAL POLYSACCHARIDE ANTIBODY (MULTI): ICD-10-CM

## 2023-11-29 DIAGNOSIS — M41.9 SCOLIOSIS, UNSPECIFIED SCOLIOSIS TYPE, UNSPECIFIED SPINAL REGION: ICD-10-CM

## 2023-11-29 PROBLEM — C44.622 SQUAMOUS CELL CANCER OF SKIN OF RIGHT SHOULDER: Status: ACTIVE | Noted: 2023-11-29

## 2023-11-29 PROBLEM — C44.91 CANCER OF THE SKIN, BASAL CELL: Status: ACTIVE | Noted: 2023-11-29

## 2023-11-29 PROCEDURE — 99396 PREV VISIT EST AGE 40-64: CPT | Performed by: FAMILY MEDICINE

## 2023-11-29 PROCEDURE — 1036F TOBACCO NON-USER: CPT | Performed by: FAMILY MEDICINE

## 2023-11-29 RX ORDER — SULFASALAZINE 500 MG/1
1000 TABLET ORAL 2 TIMES DAILY
COMMUNITY
Start: 2023-11-12

## 2023-11-29 ASSESSMENT — PATIENT HEALTH QUESTIONNAIRE - PHQ9
SUM OF ALL RESPONSES TO PHQ9 QUESTIONS 1 AND 2: 0
1. LITTLE INTEREST OR PLEASURE IN DOING THINGS: NOT AT ALL
2. FEELING DOWN, DEPRESSED OR HOPELESS: NOT AT ALL

## 2023-11-29 NOTE — ASSESSMENT & PLAN NOTE
- has chronic low back pain, following with ortho and pain management and plans to get epidural injection

## 2023-11-29 NOTE — ASSESSMENT & PLAN NOTE
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

## 2023-11-29 NOTE — PROGRESS NOTES
"Chief complaint:   Chief Complaint   Patient presents with    Annual Exam     CPE       HPI:  Macario Jimenez is a 53 y.o. male who presents for a physical    Crohns flared this year and has started Humira. He is starting to feel better. He has colonoscopies every 2 years.     He follows with orthopedics for sciatica and low back pain. He will likely be getting an injection. He has done PT. 11/2023 had a MRI of his lumbar spine. He can not stand for 10 minute without sx.     ROS:  Constitutional:  Denies fevers, chills, night sweats  HEENT: Admits to mild tinnitus bilaterally Denies change in vision, change in hearing, sore throat, rhinorrhea, congestion  Cardiovascular: Denies chest pain, SOB, racing heart, slow heart rate, palpitations, leg edema  Pulmonary: Denies cough, wheezing, SOB  Gastrointestinal: Admits to mild heratburn Denies abdominal pain, diarrhea, constipation, nausea, vomiting  Genitourinary: Denies dysuria, hematuria, incontinence, sexual dysfunction  Integumentary: Denies rash, new or changed skin lesions  Neuro: Admits to tingling (sciatica) Denies headache, numbness  Musculoskeletal: Admits to low back pain and sciatica Denies myalgias, arthralgias  Psych: denies change in mood, sleeping difficulties  Heme: denies bruising or bleeding     Physical exam:  /68   Ht 1.88 m (6' 2\")   Wt 75.3 kg (166 lb)   BMI 21.31 kg/m²   General: NAD, well appearing male  Head: normocephalic  Ears: EAC patent, TM normal bilaterally  Eyes: EOM intact, PERRLA  Nose: moist  Mouth: moist, good dentition  Heart: RRR, no murmur appreciated  Lungs: CTAB, no wheezes, rales, rhonchi  Abdomen: soft, non tender, no organomegaly  Psych: mood and affect congruent, alert and oriented  MSK: +5/5 gross strength  Neuro: +2/4 patellar and biceps reflexes, sensation grossly intact    Assessment/Plan   Problem List Items Addressed This Visit       Crohn's disease of both small and large intestine without complications " (CMS/[facility])     - continue care with GI  - currently on Humira          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[facility])     - continue care with immunology           Lumbar radicular pain     - chronic and currently following with ortho and pain managment         Scoliosis     - has chronic low back pain, following with ortho and pain management and plans to get epidural injection         Wellness examination - Primary     - continue healthy diet and exercise habits and maintain ideal body weight  - Last Tdap 12/2020  - Last Prevnar 4/13/2023 (deficiency of anti-pneumococcal polysaccharide antibody)  - Shingrix vaccine series complete (8/24/2021 and 12/29/2021)  - fasting labs ordered, discussed USPTF recommendations D for prostate cancer screening but he is immunosuppressed and elects for screening  - follow up annually         Relevant Orders    Lipid Panel    Basic metabolic panel    PSA, total and free       Jayla Escoto, DO

## 2023-12-12 ENCOUNTER — HOSPITAL ENCOUNTER (OUTPATIENT)
Dept: PAIN MEDICINE | Facility: CLINIC | Age: 53
Discharge: HOME | End: 2023-12-12
Payer: COMMERCIAL

## 2023-12-12 ENCOUNTER — TELEMEDICINE (OUTPATIENT)
Dept: GASTROENTEROLOGY | Facility: HOSPITAL | Age: 53
End: 2023-12-12
Payer: COMMERCIAL

## 2023-12-12 ENCOUNTER — HOSPITAL ENCOUNTER (OUTPATIENT)
Dept: RADIOLOGY | Facility: HOSPITAL | Age: 53
Discharge: HOME | End: 2023-12-12
Payer: COMMERCIAL

## 2023-12-12 VITALS
HEART RATE: 86 BPM | OXYGEN SATURATION: 97 % | DIASTOLIC BLOOD PRESSURE: 78 MMHG | TEMPERATURE: 96.4 F | RESPIRATION RATE: 15 BRPM | SYSTOLIC BLOOD PRESSURE: 140 MMHG

## 2023-12-12 DIAGNOSIS — K50.80 CROHN'S DISEASE OF BOTH SMALL AND LARGE INTESTINE WITHOUT COMPLICATIONS (MULTI): Primary | ICD-10-CM

## 2023-12-12 DIAGNOSIS — M54.16 LUMBAR RADICULOPATHY: ICD-10-CM

## 2023-12-12 DIAGNOSIS — R10.30 LOWER ABDOMINAL PAIN: ICD-10-CM

## 2023-12-12 PROCEDURE — 7100000009 HC PHASE TWO TIME - INITIAL BASE CHARGE: Performed by: PAIN MEDICINE

## 2023-12-12 PROCEDURE — 99213 OFFICE O/P EST LOW 20 MIN: CPT | Performed by: INTERNAL MEDICINE

## 2023-12-12 PROCEDURE — 62323 NJX INTERLAMINAR LMBR/SAC: CPT | Performed by: PAIN MEDICINE

## 2023-12-12 PROCEDURE — 76000 FLUOROSCOPY <1 HR PHYS/QHP: CPT

## 2023-12-12 PROCEDURE — 2500000004 HC RX 250 GENERAL PHARMACY W/ HCPCS (ALT 636 FOR OP/ED): Performed by: PAIN MEDICINE

## 2023-12-12 PROCEDURE — 7100000010 HC PHASE TWO TIME - EACH INCREMENTAL 1 MINUTE: Performed by: PAIN MEDICINE

## 2023-12-12 PROCEDURE — 96372 THER/PROPH/DIAG INJ SC/IM: CPT | Performed by: PAIN MEDICINE

## 2023-12-12 RX ORDER — BUPIVACAINE HYDROCHLORIDE 2.5 MG/ML
5 INJECTION, SOLUTION EPIDURAL; INFILTRATION; INTRACAUDAL ONCE
Status: COMPLETED | OUTPATIENT
Start: 2023-12-12 | End: 2023-12-12

## 2023-12-12 RX ORDER — METHYLPREDNISOLONE ACETATE 40 MG/ML
80 INJECTION, SUSPENSION INTRA-ARTICULAR; INTRALESIONAL; INTRAMUSCULAR; SOFT TISSUE ONCE
Status: COMPLETED | OUTPATIENT
Start: 2023-12-12 | End: 2023-12-12

## 2023-12-12 RX ADMIN — BUPIVACAINE HYDROCHLORIDE 12.5 MG: 2.5 INJECTION, SOLUTION EPIDURAL; INFILTRATION; INTRACAUDAL; PERINEURAL at 11:38

## 2023-12-12 RX ADMIN — METHYLPREDNISOLONE ACETATE 80 MG: 40 INJECTION, SUSPENSION INTRA-ARTICULAR; INTRALESIONAL; INTRAMUSCULAR; SOFT TISSUE at 11:39

## 2023-12-12 ASSESSMENT — PAIN - FUNCTIONAL ASSESSMENT: PAIN_FUNCTIONAL_ASSESSMENT: 0-10

## 2023-12-12 ASSESSMENT — PAIN SCALES - GENERAL: PAINLEVEL_OUTOF10: 5 - MODERATE PAIN

## 2023-12-12 NOTE — PROGRESS NOTES
Subjective   Patient ID: Macario Jimenez is a 53 y.o. male who presents for No chief complaint on file..  HPI    Review of Systems    Objective   Physical Exam    Assessment/Plan   {Assess/PlanSmartLinks:62798}         Mikie Pereira MD 12/11/23 9:24 PM

## 2023-12-12 NOTE — DISCHARGE INSTRUCTIONS
Post-injection instructions:    Your pain may not be gone immediately after the procedure--it usually takes the steroid 3-5 days to start working.   It may take several weeks for the medicine to reach its' full effect.   Pay attention to how much pain relief (what percentage compared to before the procedure) you get and for how long it lasts.     Activity: Avoid strenuous activity for 24 hours. After that return to your normal activity level.     Bandages: Remove after 24 hours     Showering/Bathing: You may shower after bandage is removed     Follow up: CALL OFFICE IN 7 DAYS [phone] LEAVE MESSAGE ABOUT THE RELIEF THAT WAS OBTAINED      Call the doctor immediately: if you notice:     Excessive bleeding from procedure site (brisk bright red bleeding from the site or bleeding that soaks the bandages or does not stop)   Severe headache  Inability to walk, leg or arm weakness or numbness that is worse after the procedure   Uncontrolled pain   New urinary or fecal incontinence   Signs of infection: Fever above 101.5F, redness, swelling, pus or drainage from the site    Epidural Injection    Why is this procedure done?  With an epidural injection, the doctor injects drugs deep into the area around your spinal cord. This is different than epidural anesthesia that is used for surgery or when a woman has a baby. Your spine is a group of bones in your back that protect the nerves in your spinal cord. Problems with your spine can cause swollen nerves in the spinal cord. This swelling leads to pain and can limit movement. In an epidural injection, the doctor may give you a drug to help with swelling and pain.  You may have an epidural injection in different parts of your back, based on where your pain is. For pain in your head or arms, you may have a cervical epidural injection. If your pain is in your upper or middle back, you may get a thoracic epidural injection. For pain in your lower back or legs, you may get a  lumbar epidural injection.    What will the results be?  The treatment may:  Lower pain  Reduce swelling in the nerves  Improve movement  What happens before the procedure?  Your doctor will take your history. Talk to the doctor about:  All the drugs you are taking. Be sure to include all prescription and over-the-counter (OTC) drugs, and herbal supplements. Tell the doctor about any drug allergy. Bring a list of drugs you take with you.  If you have high blood sugar or diabetes. Your drugs may need to be changed.  Any bleeding problems. Be sure to tell your doctor if you are taking any drugs that may cause bleeding. Some of these are warfarin, rivaroxaban, apixaban, ticagrelor, clopidogrel, ketorolac, ibuprofen, naproxen, or aspirin. Certain vitamins and herbs, such as garlic and fish oil, may also add to the risk for bleeding. You may need to stop these drugs as well. Talk to your doctor about them.  Tell the doctor if you are pregnant.  You will not be allowed to drive right away after the procedure. Ask a family member or a friend to drive you home.  What happens during the procedure?  To help the doctor make sure the drugs are being injected in the right place, your doctor may do an x-ray of your spine. Other times your doctor may do a continuous x-ray during the procedure. This is a fluoroscopy. The doctor may also use a colored dye or a contrast dye to check where to inject the drug.  You may be given a drug to help you relax. You may be given a drug to make the area of the injection numb.  The doctor will clean the skin on your back or neck. This helps prevent infection.  The doctor will put a needle through the skin toward your spine. The drug will be injected into a space near the spine.  The needle will be taken out and a bandage will be placed over the injection site.  What happens after the procedure?  Staff will check on you to make sure you are doing well. They will tell you when you can go  home.  What care is needed at home?  Relax on the day of the injection.  Do not drive or run machines for at least 12 hours afterwards.  Apply ice to the injection site. Place an ice pack or a bag of frozen peas wrapped in a towel over the painful part. Never put ice right on the skin. Do not leave the ice on more than 10 to 15 minutes at a time.  It may take a few days before you will feel the effects of the injection.  What follow-up care is needed?  Your doctor may ask you to make visits to the office to check on your progress. Be sure to keep these visits. You may also need to see a physical therapist (PT). The PT will teach you exercises to help you get back your strength and motion. Ask your doctor when you can exercise.  What problems could happen?  Bleeding  Infection (rare)  Headache  Nerve injury  If you have diabetes, your blood sugar can go up after the injection. Check with your doctor if you need more treatment for this.  Last Reviewed Date

## 2023-12-12 NOTE — OP NOTE
Operation  Midline lumbar epidural steroid injection. Level performed L5-S1       Surgical indications  The patient is here today to receive an epidural steroid injection to assist with pain.    Operative report  The patient was taken to the procedure room, was placed into a prone positioning. The lumbar area was prepped and draped sterilely in the normal fashion. Under fluoroscopic guidance the epidural space was identified. The skin and subcutaneous tissue was anesthetized with 1% lidocaine. An 18-gauge Tuohy needle was introduced using the normal saline loss-of-resistance technique. Once the loss of resistance had been achieved, final positioning of the needle was confirmed with negative aspiration of heme and CSF, with the injection of contrast material showing spread in the epidural space, confirmed in AP and lateral view. Then the patient received 80 mg of Depo-Medrol diluted into normal saline preservative-free and 2 mL of 0.25% bupivacaine making total volume of 8 mL. The patient tolerated the procedure well, was taken to the recovery room, allowed to recover sufficient amount of time and then was discharged home in stable condition. The patient was advised to followup with the [facility] to reassess improvement on as-needed basis.    Thank you for allowing me to participate in the care of this patient.    Naz Remy MD  Medical director of [facility]   11:42 AM  12/12/23

## 2023-12-12 NOTE — PROGRESS NOTES
REASON FOR VISIT:  Virtual F/U for Crohn's disease    HPI:  Macario Jimenez is a 53 y.o. male who presents for virtual follow-up of Crohn's ileocolitis (diagnosed 1986) s/p ileocecal resection in 1990.     Maintained on 5-ASA, 6MP, and Remicade 5 mg/kg (was off for approximately 1-2 years in early 2000s). 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 He then continued to do well on Remicade monotherapy. Previously on Asacol, Lialda, and Balsalazide.     1/2019 Anser IFX 2.2 with no antibodies on every 8 week in.fusions     12/2020 colonoscopy showed normal leopoldo-TI, normal anastomosis and quiescent colitis; one rectal polyp seen and removed. Polyp was inflammatory; biopsies with only mild chronic active colitis in the rectum; other biopsies WNL.     ANSER IFX 2/2021 just 1.7 with no antibodies. We decided to stop infliximab and last infliximab infusion 3/2021.      12/14/2021 Hydrogen Breath Test negative for SIBO     4/2023, symptoms of flare off meds with bleeding and frequency prior month along with oral ulcers and hand pain. Fecal Calprotectin 2270 ug/g. Did not respond to 5-ASA or topical therapy and needed a course of prednisone.     5/17/23 colonoscopy w/ some recurrent colitis involving the rectum and sigmoid that was relatively mild, but diffuse. He also had some recurrent ileitis with slightly worsened disease at the anastomosis, though no stenosis has developed. SES CD equals 8.     He did not feel much better on prednisone with regards to urgency and mucus in the stool and lower abdominal discomfort with defecation. Frequency dropped some to about 4-5 daily from max of 8; his baseline is about 2 daily. Started on Humira and prednisone tapered off.     7/2023 was improved on Humira, but still with some frequency and urgency. Humira level  "was subtherapeutic at 5.3. Fecal calprotectin remained elevated at greater than 1800. Moved to weekly Humira.     (+) mannose-binding lectin deficiency recently diagnosed; follows with Dr. Deshpande     9/2023 seen on weekly Humira about 3 weeks but still stooling 3-4 times a day. This is is 2-3 stools more than his normal. Still with urgency and also with mucus leaking out. Using psyllium husk and helps form stool. If doesn't use psyllium, stool is soupy. Felt  about 70% better. Did travel to [place] and didn't really worry about bathrooms. 20% of the time, cannot tell if going to pass gas or pass stool/liquid per rectum.  Of the 3-4 stools daily, 1-2 are formed stools and 1-2 are white mucus or loose stool. Overall, as long as on psyllium, stools are formed. Added sulfasalazine to see if any benefit.    10/2023 Humira level 18; FCP >1500.    In virtual follow-up today, is feeling quite well with 2-3 stools daily.  Bowel movements are formed.  He has no mucus, no blood, no pain with bowel movements, or any urgency to move his bowels.  He has a small amount of gas intermittently that he associates with dietary indiscretion.  However, he is able to eat what ever he wants.  He feels quite well and states that he \"does not need to think about my bowels\".  He has started on sulfasalazine and is taking 1 g twice daily, tolerating this without difficulty.  He also continues on psyllium.  He feels improved from when he was last seen.  The major difference is that his stools are now formed, there is no urgency, there is no mucus, there is no blood.    He has no IBD extraintestinal manifestations.    REVIEW OF SYSTEMS    No Known Allergies    Past Medical History:   Diagnosis Date    AB (asthmatic bronchitis) 08/04/2023    Abdominal pain, lower 08/04/2023    Chronic sinusitis, unspecified 04/02/2018    Recurrent sinusitis    Crohn's disease, unspecified, without complications (CMS/[facility]) 09/29/2021    Crohn's disease    " 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    Gilbert's syndrome    Iron deficiency anemia, unspecified 12/07/2013    Iron deficiency anemia    Low back pain 2018    Personal history of malignant neoplasm, unspecified     History of malignant neoplasm    Personal history of other diseases of the digestive system     History of gastroesophageal reflux (GERD)    Personal history of other diseases of the nervous system and sense organs     History of sleep apnea    Personal history of other diseases of the nervous system and sense organs 04/02/2018    History of eustachian tube dysfunction    Personal history of other diseases of the respiratory system 08/04/2017    History of acute bronchitis    Personal history of other diseases of the respiratory system 04/02/2018    History of laryngitis    Recurrent pansinusitis 08/04/2023    Unspecified asthma, uncomplicated 12/07/2013    Asthma       Past Surgical History:   Procedure Laterality Date    APPENDECTOMY  02/27/2015    Appendectomy    BACK SURGERY  1983    NASAL SEPTUM SURGERY  02/27/2015    Nasal Septal Deviation Repair    OTHER SURGICAL HISTORY  11/16/2018    Vertebral fusion    REFRACTIVE SURGERY  02/27/2015    Corneal LASIK    SINUS SURGERY  07/13/2021    Sinus Surgery    SMALL INTESTINE SURGERY  02/27/2015    Small Bowel Resection    SPINAL FUSION  1983       Current Outpatient Medications   Medication Sig Dispense Refill    adalimumab-adaz 40 mg/0.4 mL pen injector Inject 40 mg under the skin every 7 days. 4 mL 11    sulfaSALAzine (Azulfidine) 500 mg tablet Take 2 tablets (1,000 mg) by mouth 2 times a day.       No current facility-administered medications for this visit.       PHYSICAL EXAM:  There were no vitals taken for this visit.    This was a virtual visit and there was no physical examination performed today.  On video call he was alert and in no acute distress and looked healthy.    Lab Results   Component Value Date    WBC 5.4 08/08/2023    HGB 14.1 08/08/2023     HCT 42.0 08/08/2023    MCV 93 08/08/2023     08/08/2023     Lab Results   Component Value Date    ALT 8 (L) 08/04/2023    AST 12 08/04/2023    ALKPHOS 58 08/04/2023    BILITOT 1.0 08/04/2023       === 12/08/17 ===    CT SINUS WO CONTRAST    - Impression -  Stable retention cyst in the posterior aspect of the right sphenoid  sinus.    Patent bilateral maxillary antrostomies.    Minor mucosal thickening as described. No evidence of sinusitis. No  fluid levels.    Hypertrophy of the right inferior turbinate.      ASSESSMENT  #Crohn's disease-on weekly Humira and sulfasalazine 1 g twice daily he feels well with normal bowel habits.  Lowering therapy without any side effects.  He has made progress since last seen 3 months ago.  However, fecal calprotectin was still elevated in October.  This may just be some lag in mucosal healing internally.  We will not make any changes in therapy and we will repeat a fecal calprotectin in approximately 3-1/2 months (March 2024).  He is due for laboratory monitoring per his PCP which we will check renal function given initiation of sulfasalazine.    As long as he is doing well clinically, we will have him follow-up in May or June.  He knows to call with any change in symptoms or other concerns or questions.  Once we get improvement in the fecal calprotectin and, hopefully, normalization, we can discuss repeating colonoscopy to document mucosal healing.    PLAN  1) continue Humira injections once weekly  2) continue sulfasalazine 1 tablet twice daily  3) as long as you are feeling well, repeat stool fecal calprotectin in March 2024  4) complete labs as ordered by Dr. Escoto  5) as long as symptoms stable, follow-up with me in May or June 2024  6) call the office with any questions or concerns or issues related to Humira therapy

## 2023-12-12 NOTE — PATIENT INSTRUCTIONS
Thank you for speaking with me today for a virtual telehealth visit.  It is great that you are feeling well on your current therapy.  We will not make any changes.  You should continue weekly Humira and sulfasalazine.  As reviewed today:    PLAN  1) continue Humira injections once weekly  2) continue sulfasalazine 1 tablet twice daily  3) as long as you are feeling well, repeat stool fecal calprotectin in March 2024  4) complete labs as ordered by Dr. Escoto  5) as long as symptoms stable, follow-up with me in May or June 2024  6) call the office with any questions or concerns or issues related to Humira therapy

## 2023-12-20 ENCOUNTER — TELEPHONE (OUTPATIENT)
Dept: PAIN MEDICINE | Facility: CLINIC | Age: 53
End: 2023-12-20
Payer: COMMERCIAL

## 2023-12-20 NOTE — TELEPHONE ENCOUNTER
The pt called in and stated he feels very good after his Lumbar Epidural steroid injection. Has around 85% relief. Will call when he needs another injection.

## 2023-12-27 ENCOUNTER — ANCILLARY PROCEDURE (OUTPATIENT)
Dept: RADIOLOGY | Facility: CLINIC | Age: 53
End: 2023-12-27
Payer: COMMERCIAL

## 2023-12-27 ENCOUNTER — LAB (OUTPATIENT)
Dept: LAB | Facility: LAB | Age: 53
End: 2023-12-27
Payer: COMMERCIAL

## 2023-12-27 DIAGNOSIS — Z00.00 WELLNESS EXAMINATION: ICD-10-CM

## 2023-12-27 DIAGNOSIS — J06.9 ACUTE UPPER RESPIRATORY INFECTION, UNSPECIFIED: ICD-10-CM

## 2023-12-27 DIAGNOSIS — R05.1 ACUTE COUGH: ICD-10-CM

## 2023-12-27 LAB
ANION GAP SERPL CALC-SCNC: 11 MMOL/L (ref 10–20)
BUN SERPL-MCNC: 10 MG/DL (ref 6–23)
CALCIUM SERPL-MCNC: 9.6 MG/DL (ref 8.6–10.3)
CHLORIDE SERPL-SCNC: 104 MMOL/L (ref 98–107)
CHOLEST SERPL-MCNC: 164 MG/DL (ref 0–199)
CHOLESTEROL/HDL RATIO: 3.7
CO2 SERPL-SCNC: 31 MMOL/L (ref 21–32)
CREAT SERPL-MCNC: 0.83 MG/DL (ref 0.5–1.3)
GFR SERPL CREATININE-BSD FRML MDRD: >90 ML/MIN/1.73M*2
GLUCOSE SERPL-MCNC: 81 MG/DL (ref 74–99)
HDLC SERPL-MCNC: 44.5 MG/DL
LDLC SERPL CALC-MCNC: 101 MG/DL
NON HDL CHOLESTEROL: 120 MG/DL (ref 0–149)
POTASSIUM SERPL-SCNC: 4.2 MMOL/L (ref 3.5–5.3)
SODIUM SERPL-SCNC: 142 MMOL/L (ref 136–145)
TRIGL SERPL-MCNC: 92 MG/DL (ref 0–149)
VLDL: 18 MG/DL (ref 0–40)

## 2023-12-27 PROCEDURE — 36415 COLL VENOUS BLD VENIPUNCTURE: CPT

## 2023-12-27 PROCEDURE — 84154 ASSAY OF PSA FREE: CPT

## 2023-12-27 PROCEDURE — 71046 X-RAY EXAM CHEST 2 VIEWS: CPT

## 2023-12-27 PROCEDURE — 80048 BASIC METABOLIC PNL TOTAL CA: CPT

## 2023-12-27 PROCEDURE — 80061 LIPID PANEL: CPT

## 2023-12-27 PROCEDURE — 84153 ASSAY OF PSA TOTAL: CPT

## 2023-12-27 PROCEDURE — 71046 X-RAY EXAM CHEST 2 VIEWS: CPT | Performed by: RADIOLOGY

## 2023-12-29 LAB
PSA FREE MFR SERPL: 7 %
PSA FREE SERPL-MCNC: 0.2 NG/ML
PSA SERPL IA-MCNC: 2.8 NG/ML (ref 0–4)

## 2024-01-09 DIAGNOSIS — K50.80 CROHN'S DISEASE OF BOTH SMALL AND LARGE INTESTINE WITHOUT COMPLICATION (MULTI): ICD-10-CM

## 2024-01-09 RX ORDER — ADALIMUMAB-ADAZ 40 MG/.4ML
40 INJECTION, SOLUTION SUBCUTANEOUS
Qty: 4 ML | Refills: 11 | Status: SHIPPED | OUTPATIENT
Start: 2024-01-09 | End: 2024-01-10 | Stop reason: CLARIF

## 2024-01-10 DIAGNOSIS — K50.80 CROHN'S DISEASE OF BOTH SMALL AND LARGE INTESTINE WITHOUT COMPLICATIONS (MULTI): Primary | ICD-10-CM

## 2024-01-16 ENCOUNTER — APPOINTMENT (OUTPATIENT)
Dept: PRIMARY CARE | Facility: CLINIC | Age: 54
End: 2024-01-16
Payer: COMMERCIAL

## 2024-03-05 ENCOUNTER — OFFICE VISIT (OUTPATIENT)
Dept: DERMATOLOGY | Facility: CLINIC | Age: 54
End: 2024-03-05
Payer: COMMERCIAL

## 2024-03-05 DIAGNOSIS — D18.01 HEMANGIOMA OF SKIN: ICD-10-CM

## 2024-03-05 DIAGNOSIS — L82.1 SEBORRHEIC KERATOSIS: ICD-10-CM

## 2024-03-05 DIAGNOSIS — Z85.828 PERSONAL HISTORY OF SQUAMOUS CELL CARCINOMA OF SKIN: ICD-10-CM

## 2024-03-05 DIAGNOSIS — Z85.828 PERSONAL HISTORY OF SKIN CANCER: ICD-10-CM

## 2024-03-05 DIAGNOSIS — L82.0 INFLAMED SEBORRHEIC KERATOSIS: Primary | ICD-10-CM

## 2024-03-05 DIAGNOSIS — L81.4 LENTIGO: ICD-10-CM

## 2024-03-05 DIAGNOSIS — D22.9 MULTIPLE BENIGN NEVI: ICD-10-CM

## 2024-03-05 PROCEDURE — 17111 DESTRUCTION B9 LESIONS 15/>: CPT | Performed by: STUDENT IN AN ORGANIZED HEALTH CARE EDUCATION/TRAINING PROGRAM

## 2024-03-05 PROCEDURE — 1036F TOBACCO NON-USER: CPT | Performed by: STUDENT IN AN ORGANIZED HEALTH CARE EDUCATION/TRAINING PROGRAM

## 2024-03-05 PROCEDURE — 99213 OFFICE O/P EST LOW 20 MIN: CPT | Performed by: STUDENT IN AN ORGANIZED HEALTH CARE EDUCATION/TRAINING PROGRAM

## 2024-03-05 NOTE — PROGRESS NOTES
Subjective   Macario Jimenez is a 53 y.o. male who presents for the following: Skin Check (LV: 9/5/23 FBSE.).    Hx of Chrons.  Current treatment: Humira (started 5/2023); Previously received Remicade infusions (stopped 3/2021)    Skin Cancer History  [facility] - right shoulder 2017 (treated surgically)  SCC - right shoulder 2017 (treated surgically)  AK's    Family History of Skin Cancer  None    The following portions of the chart were reviewed this encounter and updated as appropriate:         Review of Systems: No other skin or systemic complaints.    Objective   Well appearing patient in no apparent distress; mood and affect are within normal limits.    A full examination was performed including scalp, head, eyes, ears, nose, lips, neck, chest, axillae, abdomen, back, buttocks, bilateral upper extremities, bilateral lower extremities, hands, feet, fingers, toes, fingernails, and toenails. All findings within normal limits unless otherwise noted below.    Well healed scar(s) at site(s) of prior treatment    Scattered cherry-red papule(s).    Scattered tan macules in sun-exposed areas.    Stuck on verrucous, tan-brown papules and plaques.      Scattered, uniform and benign-appearing, regular brown melanocytic papules and macules.    Left Inframammary Fold, Mid Back (7), Neck - Anterior (6), Right Abdomen (side) - Upper, Right Knee - Anterior  Brown verrucous stuck-on papules, with erythema, patient complains of irritated nature.       Assessment/Plan   Inflamed seborrheic keratosis (16)  Right Knee - Anterior; Neck - Anterior (6); Right Abdomen (side) - Upper; Left Inframammary Fold; Mid Back (7)    -Reassured of benign nature but given symptomatic nature, offered treatment with LN2 today. Discussed r/b including risks of pain, redness, irritation, dyspigmentation. Patient verbalized understanding and desire for treatment today.    Destr of lesion - Left Inframammary Fold, Mid Back (3), Neck - Anterior (2), Right Abdomen  (side) - Upper, Right Knee - Anterior  Complexity: simple    Destruction method: cryotherapy    Informed consent: discussed and consent obtained    Lesion destroyed using liquid nitrogen: Yes    Cryotherapy cycles:  3  Outcome: patient tolerated procedure well with no complications    Post-procedure details: wound care instructions given      Personal history of squamous cell carcinoma of skin    Personal history of skin cancer    No evidence of recurrence at site(s) of prior treatment  Continue photoprotection with sun-protective clothing and sunscreen SPF 30+ daily  Continue routine self-skin examination  Continue to follow up every 6 months for routine FBSE or earlier prn for new/changing/concerning lesions       Hemangioma of skin    Reassured of benign nature of lesions    Lentigo    Reassured of benign nature of lesions    Seborrheic keratosis    Reassured of benign nature of lesions    Multiple benign nevi    Reassured of benign nature of lesions

## 2024-03-05 NOTE — PATIENT INSTRUCTIONS

## 2024-03-19 ENCOUNTER — OFFICE VISIT (OUTPATIENT)
Dept: PAIN MEDICINE | Facility: CLINIC | Age: 54
End: 2024-03-19
Payer: COMMERCIAL

## 2024-03-19 VITALS
TEMPERATURE: 96.6 F | HEART RATE: 68 BPM | SYSTOLIC BLOOD PRESSURE: 127 MMHG | OXYGEN SATURATION: 98 % | DIASTOLIC BLOOD PRESSURE: 86 MMHG | RESPIRATION RATE: 16 BRPM

## 2024-03-19 DIAGNOSIS — M47.816 LUMBAR SPONDYLOSIS: Primary | ICD-10-CM

## 2024-03-19 PROCEDURE — 1036F TOBACCO NON-USER: CPT | Performed by: NURSE PRACTITIONER

## 2024-03-19 PROCEDURE — 99214 OFFICE O/P EST MOD 30 MIN: CPT | Mod: ZK | Performed by: NURSE PRACTITIONER

## 2024-03-19 PROCEDURE — 99214 OFFICE O/P EST MOD 30 MIN: CPT | Performed by: NURSE PRACTITIONER

## 2024-03-19 ASSESSMENT — PAIN SCALES - GENERAL
PAINLEVEL: 8
PAINLEVEL_OUTOF10: 8

## 2024-03-19 ASSESSMENT — PAIN DESCRIPTION - DESCRIPTORS: DESCRIPTORS: ACHING;DULL

## 2024-03-19 ASSESSMENT — PAIN - FUNCTIONAL ASSESSMENT: PAIN_FUNCTIONAL_ASSESSMENT: 0-10

## 2024-03-19 NOTE — H&P
Subjective   Patient ID: Macario Jimenez is a 53 y.o. male who presents for Pain (MINIMAL RELIEF AFTER INITIAL INJECTION).  HPI  53 years old, male he is here today for a follow up after a procedure, midline epidural steroid injection L5/S1 done on 12/12/2023. He states that he had a very minimal response of about less than 50% reduction for less than a week.  He is known in this clinic because of chronic back pain. He had surgery in his back in the past, with Wetzel placement, he had scoliosis. He has been undergoing PT for about a year without relief from his back. He is taking occasional Tylenol for his pain with very little relief. OARRS obtained and reviewed, no abuse or misuse with prescribed medication noted.  His pain is in his back radiating to the left lower extremity, pain is worse when standing and walking, pain is better when sitting or resting. Level of pain while sitting is 0 to 1/10, when standing or walking about 6 to 8/10.  He is able to perform activities of daily living on independent  His Oswestry score is 15, he is on moderate disability level.  Review of Systems   Review of systems x 10 is negative.   No recent injury or falls reported.   No recent change in medical condition reported.   No recent weakness reported.   Still able to control bowel and bladder function.   Denies fever, cough, shortness of breath recently.   No interval change with medication/health issues reported.    Objective   Physical Exam  Awake,alert, no acute distress, appropriate.  Spine is of normal curvature.  Full ROM on all 4 extremities, sensation and motor intact, no vascular compromise.  No pedal edema, normal gait.  Skin warm, dry, intact, turgor is normal.  Radiating pain to the left lower extremity.  /86 (BP Location: Left arm, Patient Position: Sitting, BP Cuff Size: Adult long)   Pulse 68   Temp 35.9 °C (96.6 °F) (Temporal)   Resp 16   SpO2 98%       Assessment/Plan     Discussed an option  procedure for him,  Diagnostic facet, median nerve branch block, bilateral L4/L5, L5/S1 under fluoroscopic guidance.  He will be scheduled for the above procedure.  Explained above procedure for this patient, also gave him written information.  Follow up in 3 months time or as needed basis  Explained plan to this patient, and patient verbalized understanding and agreement with the plan. If there is questions or concerns, please feel free to contact me to clarify.    Chronic back pain associated with lumbago, lumbar spondylosis, lumbar radiculopathy, history of previous back surgery, scoliosis.         Clara Donis, LEONARD-CNP 03/19/24 11:14 AM

## 2024-03-19 NOTE — PROGRESS NOTES
Subjective   Patient ID: Macario Jimenez is a 53 y.o. male.    HPI NO PAIN RELIEF AFTER INITIAL INJECTION THIS OFFICE.     Review of Systems    Objective   Physical Exam    Assessment/Plan   There are no diagnoses linked to this encounter.

## 2024-03-29 ENCOUNTER — LAB (OUTPATIENT)
Dept: LAB | Facility: LAB | Age: 54
End: 2024-03-29
Payer: COMMERCIAL

## 2024-03-29 DIAGNOSIS — R10.30 LOWER ABDOMINAL PAIN: ICD-10-CM

## 2024-03-29 PROCEDURE — 83993 ASSAY FOR CALPROTECTIN FECAL: CPT

## 2024-04-02 LAB — CALPROTECTIN STL-MCNT: 34 UG/G

## 2024-04-09 ENCOUNTER — HOSPITAL ENCOUNTER (OUTPATIENT)
Dept: PAIN MEDICINE | Facility: CLINIC | Age: 54
Discharge: HOME | End: 2024-04-09
Payer: COMMERCIAL

## 2024-04-09 ENCOUNTER — HOSPITAL ENCOUNTER (OUTPATIENT)
Dept: RADIOLOGY | Facility: HOSPITAL | Age: 54
Discharge: HOME | End: 2024-04-09
Payer: COMMERCIAL

## 2024-04-09 VITALS
RESPIRATION RATE: 20 BRPM | DIASTOLIC BLOOD PRESSURE: 82 MMHG | TEMPERATURE: 96.9 F | SYSTOLIC BLOOD PRESSURE: 120 MMHG | HEART RATE: 73 BPM | OXYGEN SATURATION: 98 %

## 2024-04-09 DIAGNOSIS — M47.816 LUMBAR SPONDYLOSIS: ICD-10-CM

## 2024-04-09 PROCEDURE — 64494 INJ PARAVERT F JNT L/S 2 LEV: CPT | Performed by: PAIN MEDICINE

## 2024-04-09 PROCEDURE — 2500000004 HC RX 250 GENERAL PHARMACY W/ HCPCS (ALT 636 FOR OP/ED): Performed by: PAIN MEDICINE

## 2024-04-09 PROCEDURE — 64493 INJ PARAVERT F JNT L/S 1 LEV: CPT | Performed by: PAIN MEDICINE

## 2024-04-09 PROCEDURE — 64493 INJ PARAVERT F JNT L/S 1 LEV: CPT | Mod: 50 | Performed by: PAIN MEDICINE

## 2024-04-09 PROCEDURE — 7100000009 HC PHASE TWO TIME - INITIAL BASE CHARGE: Performed by: PAIN MEDICINE

## 2024-04-09 PROCEDURE — 2500000005 HC RX 250 GENERAL PHARMACY W/O HCPCS: Performed by: PAIN MEDICINE

## 2024-04-09 RX ORDER — LIDOCAINE IN NACL,ISO-OSMOT/PF 30 MG/3 ML
10 SYRINGE (ML) INJECTION ONCE
Status: COMPLETED | OUTPATIENT
Start: 2024-04-09 | End: 2024-04-09

## 2024-04-09 RX ORDER — BUPIVACAINE HYDROCHLORIDE 5 MG/ML
10 INJECTION, SOLUTION PERINEURAL ONCE
Status: COMPLETED | OUTPATIENT
Start: 2024-04-09 | End: 2024-04-09

## 2024-04-09 RX ADMIN — BUPIVACAINE HYDROCHLORIDE 50 MG: 5 INJECTION, SOLUTION PERINEURAL at 08:50

## 2024-04-09 RX ADMIN — Medication 100 MG: at 08:51

## 2024-04-09 ASSESSMENT — PAIN - FUNCTIONAL ASSESSMENT: PAIN_FUNCTIONAL_ASSESSMENT: 0-10

## 2024-04-09 ASSESSMENT — COLUMBIA-SUICIDE SEVERITY RATING SCALE - C-SSRS
6. HAVE YOU EVER DONE ANYTHING, STARTED TO DO ANYTHING, OR PREPARED TO DO ANYTHING TO END YOUR LIFE?: NO
1. IN THE PAST MONTH, HAVE YOU WISHED YOU WERE DEAD OR WISHED YOU COULD GO TO SLEEP AND NOT WAKE UP?: NO
2. HAVE YOU ACTUALLY HAD ANY THOUGHTS OF KILLING YOURSELF?: NO

## 2024-04-09 ASSESSMENT — PAIN SCALES - GENERAL: PAINLEVEL_OUTOF10: 2

## 2024-04-09 ASSESSMENT — PAIN DESCRIPTION - DESCRIPTORS: DESCRIPTORS: DULL

## 2024-04-09 NOTE — DISCHARGE INSTRUCTIONS
Post-injection instructions FOR FACET BLOCK      Pay attention to how much pain relief (what percentage compared to before the procedure) you get and for how long it lasts.     THIS IS A TEMPORARY NUMBING OF PAIN THIS IS BEING USED AS A DIAGNOSTIC INDICATOR IF THIS IS THE SOURCE OF YOUR PAIN    Activity:  RETURN TO NORMAL ACTIVITY  SEE IF YOU CAN APPRECIATE AN IMPROVEMENT IN THE PAIN    Bandages: Remove after 24 hours     Showering/Bathing: You may shower after bandage is removed     Follow up: CALL OFFICE NEXT BUSINESS [phone] LEAVE MESSAGE ABOUT THE % OF RELIEF THAT WAS OBTAINED AND FOR HOW MANY HOURS      Call the OFFICE immediately: if you notice:     Excessive bleeding from procedure site (brisk bright red bleeding from the site or bleeding that soaks the bandages or does not stop)   Severe headache  Inability to walk, leg or arm weakness or numbness that is worse after the procedure   Uncontrolled pain   New urinary or fecal incontinence   Signs of infection: Fever above 101.5F, redness, swelling, pus or drainage from the site

## 2024-04-09 NOTE — OP NOTE
Clinical Note  The patient is here today to receive diagnostic medial nerve branch block bilateral L4-L5 L5-S1 to assist with pain.    Procedure Note  The patient was taken to the procedure room, was placed into a prone position. The area was prepped and draped sterilely in the normal fashion. The patient was throughout the procedure monitored by the nursing staff. The skin and subcutaneous tissue was anesthetized with 1% lidocaine. Then 22-gauge spinal needles were introduced into the approximation of the medial nerve branches at the above mentioned level, confirmed in AP and oblique view with negative aspiration of heme and CSF. The patient received 0.5 mL of 0.5% Marcaine per site. The patient tolerated the procedure well, was taken to the recovery room, allowed to recover for sufficient amount of time and then was discharged home in stable condition. The patient was advised to followup with the [facility] to reassess the improvement and determine the need for the radiofrequency ablation.    Thank you for allowing me to participate in the care of this patient.

## 2024-04-10 ENCOUNTER — TELEPHONE (OUTPATIENT)
Dept: PAIN MEDICINE | Facility: CLINIC | Age: 54
End: 2024-04-10
Payer: COMMERCIAL

## 2024-04-11 DIAGNOSIS — M47.816 LUMBAR SPONDYLOSIS: Primary | ICD-10-CM

## 2024-04-25 DIAGNOSIS — M47.816 LUMBAR SPONDYLOSIS: Primary | ICD-10-CM

## 2024-05-07 ENCOUNTER — HOSPITAL ENCOUNTER (OUTPATIENT)
Dept: PAIN MEDICINE | Facility: CLINIC | Age: 54
Discharge: HOME | End: 2024-05-07
Payer: COMMERCIAL

## 2024-05-07 ENCOUNTER — HOSPITAL ENCOUNTER (OUTPATIENT)
Dept: RADIOLOGY | Facility: HOSPITAL | Age: 54
Discharge: HOME | End: 2024-05-07
Payer: COMMERCIAL

## 2024-05-07 VITALS
DIASTOLIC BLOOD PRESSURE: 82 MMHG | OXYGEN SATURATION: 99 % | SYSTOLIC BLOOD PRESSURE: 121 MMHG | RESPIRATION RATE: 18 BRPM | HEART RATE: 73 BPM | TEMPERATURE: 97.2 F

## 2024-05-07 DIAGNOSIS — M47.816 LUMBAR SPONDYLOSIS: ICD-10-CM

## 2024-05-07 PROCEDURE — 7100000010 HC PHASE TWO TIME - EACH INCREMENTAL 1 MINUTE: Performed by: PAIN MEDICINE

## 2024-05-07 PROCEDURE — 2500000004 HC RX 250 GENERAL PHARMACY W/ HCPCS (ALT 636 FOR OP/ED): Performed by: PAIN MEDICINE

## 2024-05-07 PROCEDURE — 64494 INJ PARAVERT F JNT L/S 2 LEV: CPT | Performed by: PAIN MEDICINE

## 2024-05-07 PROCEDURE — 7100000009 HC PHASE TWO TIME - INITIAL BASE CHARGE: Performed by: PAIN MEDICINE

## 2024-05-07 PROCEDURE — 64493 INJ PARAVERT F JNT L/S 1 LEV: CPT | Mod: 50 | Performed by: PAIN MEDICINE

## 2024-05-07 PROCEDURE — 2500000005 HC RX 250 GENERAL PHARMACY W/O HCPCS: Performed by: PAIN MEDICINE

## 2024-05-07 PROCEDURE — 64493 INJ PARAVERT F JNT L/S 1 LEV: CPT | Performed by: PAIN MEDICINE

## 2024-05-07 RX ORDER — LIDOCAINE IN NACL,ISO-OSMOT/PF 30 MG/3 ML
10 SYRINGE (ML) INJECTION ONCE
Status: COMPLETED | OUTPATIENT
Start: 2024-05-07 | End: 2024-05-07

## 2024-05-07 RX ORDER — BUPIVACAINE HYDROCHLORIDE 5 MG/ML
10 INJECTION, SOLUTION PERINEURAL ONCE
Status: COMPLETED | OUTPATIENT
Start: 2024-05-07 | End: 2024-05-07

## 2024-05-07 RX ADMIN — Medication 100 MG: at 13:39

## 2024-05-07 RX ADMIN — BUPIVACAINE HYDROCHLORIDE 50 MG: 5 INJECTION, SOLUTION PERINEURAL at 13:40

## 2024-05-07 ASSESSMENT — PAIN - FUNCTIONAL ASSESSMENT: PAIN_FUNCTIONAL_ASSESSMENT: 0-10

## 2024-05-07 ASSESSMENT — PAIN SCALES - GENERAL: PAINLEVEL_OUTOF10: 1

## 2024-05-07 ASSESSMENT — COLUMBIA-SUICIDE SEVERITY RATING SCALE - C-SSRS
2. HAVE YOU ACTUALLY HAD ANY THOUGHTS OF KILLING YOURSELF?: NO
1. IN THE PAST MONTH, HAVE YOU WISHED YOU WERE DEAD OR WISHED YOU COULD GO TO SLEEP AND NOT WAKE UP?: NO
6. HAVE YOU EVER DONE ANYTHING, STARTED TO DO ANYTHING, OR PREPARED TO DO ANYTHING TO END YOUR LIFE?: NO

## 2024-05-07 ASSESSMENT — PAIN DESCRIPTION - DESCRIPTORS: DESCRIPTORS: DULL

## 2024-05-07 NOTE — DISCHARGE INSTRUCTIONS
Post-injection instructions FOR FACET BLOCK      Pay attention to how much pain relief (what percentage compared to before the procedure) you get and for how long it lasts.     THIS IS A TEMPORARY NUMBING OF PAIN THIS IS BEING USED AS A DIAGNOSTIC INDICATOR IF THIS IS THE SOURCE OF YOUR PAIN    Activity:  RETURN TO NORMAL ACTIVITY  SEE IF YOU CAN APPRECIATE AN IMPROVEMENT IN THE PAIN    Bandages: Remove after 24 hours     Showering/Bathing: You may shower after bandage is removed     Follow up: CALL OFFICE NEXT BUSINESS [phone] LEAVE MESSAGE ABOUT THE % OF RELIEF THAT WAS OBTAINED AND FOR HOW MANY HOURS      Call the OFFICE immediately: if you notice:     Excessive bleeding from procedure site (brisk bright red bleeding from the site or bleeding that soaks the bandages or does not stop)   Severe headache  Inability to walk, leg or arm weakness or numbness that is worse after the procedure   Uncontrolled pain   New urinary or fecal incontinence   Signs of infection: Fever above 101.5F, redness, swelling, pus or drainage from the site

## 2024-05-08 ENCOUNTER — TELEPHONE (OUTPATIENT)
Dept: PAIN MEDICINE | Facility: CLINIC | Age: 54
End: 2024-05-08
Payer: COMMERCIAL

## 2024-05-08 DIAGNOSIS — M47.816 LUMBAR SPONDYLOSIS: Primary | ICD-10-CM

## 2024-05-08 NOTE — TELEPHONE ENCOUNTER
Pt reports >80% relief after lumbar facet long, could you arder the next step lease and forward to secretaries?  Thanks!

## 2024-06-11 ENCOUNTER — HOSPITAL ENCOUNTER (OUTPATIENT)
Dept: RADIOLOGY | Facility: HOSPITAL | Age: 54
Discharge: HOME | End: 2024-06-11
Payer: COMMERCIAL

## 2024-06-11 ENCOUNTER — HOSPITAL ENCOUNTER (OUTPATIENT)
Dept: PAIN MEDICINE | Facility: CLINIC | Age: 54
Discharge: HOME | End: 2024-06-11
Payer: COMMERCIAL

## 2024-06-11 VITALS
DIASTOLIC BLOOD PRESSURE: 76 MMHG | RESPIRATION RATE: 20 BRPM | SYSTOLIC BLOOD PRESSURE: 113 MMHG | OXYGEN SATURATION: 99 % | HEART RATE: 80 BPM | TEMPERATURE: 96.1 F

## 2024-06-11 DIAGNOSIS — M47.816 LUMBAR SPONDYLOSIS: ICD-10-CM

## 2024-06-11 PROCEDURE — 99152 MOD SED SAME PHYS/QHP 5/>YRS: CPT | Performed by: PAIN MEDICINE

## 2024-06-11 PROCEDURE — 2720000007 HC OR 272 NO HCPCS: Performed by: PAIN MEDICINE

## 2024-06-11 PROCEDURE — 64636 DESTROY L/S FACET JNT ADDL: CPT | Performed by: PAIN MEDICINE

## 2024-06-11 PROCEDURE — 3700000012 HC SEDATION LEVEL 5+ TIME - INITIAL 15 MINUTES 5/> YEARS: Performed by: PAIN MEDICINE

## 2024-06-11 PROCEDURE — 64635 DESTROY LUMB/SAC FACET JNT: CPT | Performed by: PAIN MEDICINE

## 2024-06-11 PROCEDURE — 64636 DESTROY L/S FACET JNT ADDL: CPT | Mod: 50 | Performed by: PAIN MEDICINE

## 2024-06-11 PROCEDURE — 2500000004 HC RX 250 GENERAL PHARMACY W/ HCPCS (ALT 636 FOR OP/ED): Performed by: PAIN MEDICINE

## 2024-06-11 PROCEDURE — 64635 DESTROY LUMB/SAC FACET JNT: CPT | Mod: 50 | Performed by: PAIN MEDICINE

## 2024-06-11 RX ORDER — BUPIVACAINE HYDROCHLORIDE 5 MG/ML
10 INJECTION, SOLUTION PERINEURAL ONCE
Status: DISCONTINUED | OUTPATIENT
Start: 2024-06-11 | End: 2024-06-12 | Stop reason: HOSPADM

## 2024-06-11 RX ORDER — MIDAZOLAM HYDROCHLORIDE 1 MG/ML
1 INJECTION, SOLUTION INTRAMUSCULAR; INTRAVENOUS
Status: DISCONTINUED | OUTPATIENT
Start: 2024-06-11 | End: 2024-06-12 | Stop reason: HOSPADM

## 2024-06-11 RX ORDER — BUPIVACAINE HYDROCHLORIDE 5 MG/ML
INJECTION, SOLUTION EPIDURAL; INTRACAUDAL AS NEEDED
Status: COMPLETED | OUTPATIENT
Start: 2024-06-11 | End: 2024-06-11

## 2024-06-11 RX ORDER — LIDOCAINE IN NACL,ISO-OSMOT/PF 30 MG/3 ML
10 SYRINGE (ML) INJECTION ONCE
Status: DISCONTINUED | OUTPATIENT
Start: 2024-06-11 | End: 2024-06-12 | Stop reason: HOSPADM

## 2024-06-11 RX ORDER — MIDAZOLAM HYDROCHLORIDE 1 MG/ML
INJECTION, SOLUTION INTRAMUSCULAR; INTRAVENOUS AS NEEDED
Status: COMPLETED | OUTPATIENT
Start: 2024-06-11 | End: 2024-06-11

## 2024-06-11 RX ADMIN — BUPIVACAINE HYDROCHLORIDE 20 ML: 5 INJECTION, SOLUTION EPIDURAL; INTRACAUDAL; PERINEURAL at 11:00

## 2024-06-11 RX ADMIN — MIDAZOLAM 2 MG: 1 INJECTION INTRAMUSCULAR; INTRAVENOUS at 11:13

## 2024-06-11 ASSESSMENT — PAIN SCALES - GENERAL: PAINLEVEL_OUTOF10: 2

## 2024-06-11 ASSESSMENT — COLUMBIA-SUICIDE SEVERITY RATING SCALE - C-SSRS
6. HAVE YOU EVER DONE ANYTHING, STARTED TO DO ANYTHING, OR PREPARED TO DO ANYTHING TO END YOUR LIFE?: NO
2. HAVE YOU ACTUALLY HAD ANY THOUGHTS OF KILLING YOURSELF?: NO
1. IN THE PAST MONTH, HAVE YOU WISHED YOU WERE DEAD OR WISHED YOU COULD GO TO SLEEP AND NOT WAKE UP?: NO

## 2024-06-11 ASSESSMENT — PAIN - FUNCTIONAL ASSESSMENT: PAIN_FUNCTIONAL_ASSESSMENT: 0-10

## 2024-06-11 NOTE — H&P
History Of Present Illness  Macario Jimenez is a 53 y.o. male presenting with back pain here today with to receive radiofrequency ablation of the medial nerve branch     Past Medical History  Past Medical History:   Diagnosis Date    AB (asthmatic bronchitis) ([facility]) 08/04/2023    Abdominal pain, lower 08/04/2023    Chronic sinusitis, unspecified 04/02/2018    Recurrent sinusitis    Crohn's disease, unspecified, without complications ([facility]) 09/29/2021    Crohn's disease    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    Gilbert's syndrome    Iron deficiency anemia, unspecified 12/07/2013    Iron deficiency anemia    Low back pain 2018    Personal history of malignant neoplasm, unspecified     History of malignant neoplasm    Personal history of other diseases of the digestive system     History of gastroesophageal reflux (GERD)    Personal history of other diseases of the nervous system and sense organs     History of sleep apnea    Personal history of other diseases of the nervous system and sense organs 04/02/2018    History of eustachian tube dysfunction    Personal history of other diseases of the respiratory system 08/04/2017    History of acute bronchitis    Personal history of other diseases of the respiratory system 04/02/2018    History of laryngitis    Recurrent pansinusitis 08/04/2023    Unspecified asthma, uncomplicated ([facility]) 12/07/2013    Asthma       Surgical History  Past Surgical History:   Procedure Laterality Date    APPENDECTOMY  02/27/2015    Appendectomy    BACK SURGERY  1983    NASAL SEPTUM SURGERY  02/27/2015    Nasal Septal Deviation Repair    OTHER SURGICAL HISTORY  11/16/2018    Vertebral fusion    REFRACTIVE SURGERY  02/27/2015    Corneal LASIK    SINUS SURGERY  07/13/2021    Sinus Surgery    SMALL INTESTINE SURGERY  02/27/2015    Small Bowel Resection    SPINAL FUSION  1983        Social History  He reports that he has never smoked. He has never been exposed to tobacco smoke. He has never used  smokeless tobacco. He reports that he does not currently use alcohol. He reports that he does not use drugs.    Family History  Family History   Problem Relation Name Age of Onset    Hodgkin's lymphoma Mother Bonny     Cancer Mother Bonny     Diabetes Father Gaurav         Allergies  Patient has no known allergies.    Review of Systems   All 13 systems were reviewed and are within normal levels except as noted below or per HPI. Positive and pertinent negative responses are noted below or in the HPI   Denied any fever or chills. No weight loss and no night sweats. No cough or sputum production. No diarrhea   No constipation  No bladder and bowel incontinence and no other changes in bladder and bowel. No skin changes.   Denied opioids diversion and abuse and denies alcoholism. Denies overuse of  pain medications.   Physical Exam       Past medical history no interval changes has been noted    On physical examination    General   Alert, oriented x3 pleasant and cooperative. Does not look in any major distress.    HEENT  Pupils normal in size. Ears, nose, mouth, and throat appear to be in normal condition.  Head atraumatic      No signs of sedation or signs of withdrawal apparent.    Psychiatric   No signs of depression apparent.    Neuro   No focal neurological deficit apparent. Ambulation at baseline.      Respiratory  No respiratory distress     Abdomen  no distention     Skin  No skin markings supportive of recent IV drug usage .    Cardiovascular  Regular rate and rhythm    Last Recorded Vitals  Blood pressure 118/77, pulse 64, temperature 35.6 °C (96.1 °F), resp. rate 18, SpO2 99%.    Relevant Results             Assessment/Plan   Active Problems:  There are no active Hospital Problems.           Here for a radiofrequency ablation of the medial nerve branches of the lumbar spine area      Naz Remy MD

## 2024-06-11 NOTE — DISCHARGE INSTRUCTIONS
Post-injection instructions FOR Radiofrequency Ablation    Your radiofrequency ablation was completed today is not unusual to have some exacerbation of the pain before you get better.  Radiofrequency ablation takes an average of 2 to 3 weeks before it completely starts showing full results      Activity:  RETURN TO NORMAL ACTIVITY once the sedation is completely worn off do not sign any legal document for the first 24 hours do not drive or operate machinery for the first 24 hours until the sedation effect is completely worn off    Bandages: Remove after 24 hours     Showering/Bathing: You may shower after bandage is removed     May use ice at the site of the injection for the first 24 hours      Call the OFFICE immediately: if you notice:     Excessive bleeding from procedure site (brisk bright red bleeding from the site or bleeding that soaks the bandages or does not stop)   Severe headache  Inability to walk, leg or arm weakness or numbness that is worse after the procedure   Uncontrolled pain   New urinary or fecal incontinence   Signs of infection: Fever above 101.5F, redness, swelling, pus or drainage from the site    Please contact the pain clinic at [phone]  in 3 weeks to report results or with any further questions or concerns

## 2024-07-03 ENCOUNTER — TELEPHONE (OUTPATIENT)
Dept: PAIN MEDICINE | Facility: CLINIC | Age: 54
End: 2024-07-03
Payer: COMMERCIAL

## 2024-07-03 NOTE — TELEPHONE ENCOUNTER
Left message that he is only having 25% IMPROVEMENT AFTER THE INJECTION;  DID CALL HIM BACK REACHED HIS VOICEMAIL LEFT MESSAGE TO CALL OUR OFFICE BACK

## 2024-07-09 DIAGNOSIS — K50.80 CROHN'S DISEASE OF BOTH SMALL AND LARGE INTESTINE WITHOUT COMPLICATIONS (MULTI): Primary | ICD-10-CM

## 2024-07-09 RX ORDER — SULFASALAZINE 500 MG/1
500 TABLET ORAL 2 TIMES DAILY
Qty: 180 TABLET | Refills: 1 | Status: SHIPPED | OUTPATIENT
Start: 2024-07-09

## 2024-08-02 ENCOUNTER — OFFICE VISIT (OUTPATIENT)
Dept: PAIN MEDICINE | Facility: CLINIC | Age: 54
End: 2024-08-02
Payer: COMMERCIAL

## 2024-08-02 DIAGNOSIS — M96.1 POSTLAMINECTOMY SYNDROME, LUMBAR REGION: Primary | ICD-10-CM

## 2024-08-02 PROCEDURE — 99214 OFFICE O/P EST MOD 30 MIN: CPT | Performed by: PAIN MEDICINE

## 2024-08-02 PROCEDURE — G2211 COMPLEX E/M VISIT ADD ON: HCPCS | Performed by: PAIN MEDICINE

## 2024-08-02 PROCEDURE — 1036F TOBACCO NON-USER: CPT | Performed by: PAIN MEDICINE

## 2024-08-02 RX ORDER — ADALIMUMAB 40MG/0.4ML
40 KIT SUBCUTANEOUS
COMMUNITY
Start: 2024-07-20

## 2024-08-02 ASSESSMENT — COLUMBIA-SUICIDE SEVERITY RATING SCALE - C-SSRS
1. IN THE PAST MONTH, HAVE YOU WISHED YOU WERE DEAD OR WISHED YOU COULD GO TO SLEEP AND NOT WAKE UP?: NO
2. HAVE YOU ACTUALLY HAD ANY THOUGHTS OF KILLING YOURSELF?: NO
6. HAVE YOU EVER DONE ANYTHING, STARTED TO DO ANYTHING, OR PREPARED TO DO ANYTHING TO END YOUR LIFE?: NO

## 2024-08-02 ASSESSMENT — PAIN SCALES - GENERAL
PAINLEVEL_OUTOF10: 6
PAINLEVEL: 6

## 2024-08-02 ASSESSMENT — PATIENT HEALTH QUESTIONNAIRE - PHQ9
SUM OF ALL RESPONSES TO PHQ9 QUESTIONS 1 AND 2: 0
2. FEELING DOWN, DEPRESSED OR HOPELESS: NOT AT ALL
1. LITTLE INTEREST OR PLEASURE IN DOING THINGS: NOT AT ALL

## 2024-08-02 ASSESSMENT — PAIN DESCRIPTION - DESCRIPTORS: DESCRIPTORS: DULL;ACHING

## 2024-08-02 ASSESSMENT — PAIN - FUNCTIONAL ASSESSMENT: PAIN_FUNCTIONAL_ASSESSMENT: 0-10

## 2024-08-02 NOTE — H&P
History Of Present Illness  Macario Jimenez is a 53 y.o. male presenting with chronic back pain the patient is here today after a radiofrequency ablation of the medial nerve branches that was performed bilaterally  At the L4-L5 L5-S1 the patient does not believe that since the radiofrequency ablation any of his symptoms has improved prior to the ablation he was tried on epidural steroid injection with no significant improvement currently complaining of the pain if he lays down in a certain way describing no significant pain in the sitting position having difficulty also with pain during standing up and walk.  Rating the pain at a level of 7 out of 10.  Past Medical History  Past Medical History:   Diagnosis Date    AB (asthmatic bronchitis) ([facility]) 08/04/2023    Abdominal pain, lower 08/04/2023    Chronic sinusitis, unspecified 04/02/2018    Recurrent sinusitis    Crohn's disease, unspecified, without complications ([facility]) 09/29/2021    Crohn's disease    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    Gilbert's syndrome    Iron deficiency anemia, unspecified 12/07/2013    Iron deficiency anemia    Low back pain 2018    Personal history of malignant neoplasm, unspecified     History of malignant neoplasm    Personal history of other diseases of the digestive system     History of gastroesophageal reflux (GERD)    Personal history of other diseases of the nervous system and sense organs     History of sleep apnea    Personal history of other diseases of the nervous system and sense organs 04/02/2018    History of eustachian tube dysfunction    Personal history of other diseases of the respiratory system 08/04/2017    History of acute bronchitis    Personal history of other diseases of the respiratory system 04/02/2018    History of laryngitis    Recurrent pansinusitis 08/04/2023    Unspecified asthma, uncomplicated ([facility]) 12/07/2013    Asthma     Surgical History  Past Surgical History:   Procedure Laterality Date    APPENDECTOMY   02/27/2015    Appendectomy    BACK SURGERY  1983    NASAL SEPTUM SURGERY  02/27/2015    Nasal Septal Deviation Repair    OTHER SURGICAL HISTORY  11/16/2018    Vertebral fusion    REFRACTIVE SURGERY  02/27/2015    Corneal LASIK    SINUS SURGERY  07/13/2021    Sinus Surgery    SMALL INTESTINE SURGERY  02/27/2015    Small Bowel Resection    SPINAL FUSION  1983     Social History  He reports that he has never smoked. He has never been exposed to tobacco smoke. He has never used smokeless tobacco. He reports that he does not currently use alcohol. He reports that he does not use drugs.    Family History  Family History   Problem Relation Name Age of Onset    Hodgkin's lymphoma Mother Bonny     Cancer Mother Bonny     Diabetes Father Gaurav         Allergies  No Known Allergies  Review of Systems   All 13 systems were reviewed and are within normal levels except as noted below or per HPI. Positive and pertinent negative responses are noted below or in the HPI   Denied any fever or chills. No weight loss and no night sweats. No cough or sputum production. No diarrhea   No constipation  No bladder and bowel incontinence and no other changes in bladder and bowel. No skin changes.   Denied opioids diversion and abuse and denies alcoholism. Denies overuse of  pain medications.   Physical Exam       Past medical history no interval changes has been noted    On physical examination    General   Alert, oriented x3 pleasant and cooperative. Does not look in any major distress.    HEENT  Pupils normal in size. Ears, nose, mouth, and throat appear to be in normal condition.  Head atraumatic      No signs of sedation or signs of withdrawal apparent.    Psychiatric   No signs of depression apparent.    Neuro   No focal neurological deficit apparent. Ambulation at baseline.      Respiratory  No respiratory distress     Abdomen  no distention     Skin  No skin markings supportive of recent IV drug usage .    Cardiovascular  Regular rate  and rhythm    Last Recorded Vitals  There were no vitals taken for this visit.    Assessment/Plan   53 years old with history and physical examination supportive of chronic back pain status post Wetzel thi fusion with persistent pain despite epidural steroid injections and radiofrequency ablation of the medial nerve branches bilaterally    Plan  Discussed with the patient the different modalities available for the treatment of his condition I recommended for the patient to be seen and evaluated by a spine surgeon he already establish himself with Dr. Smith if Dr. Smith has something surgically to be offered for the patient to improve his symptoms then the patient will discuss further the possibility of the surgery if the spine surgeon Dr. Carnes was determined that the patient is none operative and his problem would not improve with surgery then I would recommend for the patient to be considered for a spinal cord stimulation trial to be offered transcutaneously if the patient fails the trial then we will consider him for an intrathecal morphine trial benefits and risk were discussed with the patient and he is welcome to follow-up with the pain clinic on as-needed basis      The above clinical summary has been dictated with voice recognition software. It has not been proofread for grammatical errors, typographical mistakes, or other semantic inconsistencies.    Thank you for visiting our office today. It was our pleasure to take part in your healthcare.     Please do not hesitate to contact the pain clinic after your visit with any questions or concerns at  M-F 8-4 pm       Naz Remy M.D.  Medical Director , Division of Pain Medicine [facility]   of Anesthesiology and Pain Medicine  [facility] [employer]     [address]  [place] [address]     Office: [phone]  Fax: [phone]      Naz Remy MD

## 2024-09-16 ENCOUNTER — APPOINTMENT (OUTPATIENT)
Dept: DERMATOLOGY | Facility: CLINIC | Age: 54
End: 2024-09-16
Payer: COMMERCIAL

## 2024-09-16 DIAGNOSIS — B35.4 TINEA CORPORIS: Primary | ICD-10-CM

## 2024-09-16 DIAGNOSIS — R21 RASH AND OTHER NONSPECIFIC SKIN ERUPTION: ICD-10-CM

## 2024-09-16 DIAGNOSIS — L81.4 LENTIGO: ICD-10-CM

## 2024-09-16 DIAGNOSIS — L30.9 DERMATITIS: ICD-10-CM

## 2024-09-16 DIAGNOSIS — L82.1 SEBORRHEIC KERATOSIS: ICD-10-CM

## 2024-09-16 PROCEDURE — 99213 OFFICE O/P EST LOW 20 MIN: CPT | Performed by: NURSE PRACTITIONER

## 2024-09-16 RX ORDER — TRIAMCINOLONE ACETONIDE 1 MG/G
CREAM TOPICAL
Qty: 80 G | Refills: 3 | Status: SHIPPED | OUTPATIENT
Start: 2024-09-16

## 2024-09-16 RX ORDER — KETOCONAZOLE 20 MG/G
CREAM TOPICAL
Qty: 30 G | Refills: 3 | Status: SHIPPED | OUTPATIENT
Start: 2024-09-16

## 2024-09-16 NOTE — PROGRESS NOTES
Subjective   Macario Jimenez is a 53 y.o. male who presents for the following: Suspicious Skin Lesion.  Skin Lesion  He describes it as a growth, that is located on his right lateral neck.  It was first noticed a few weeks ago. It has been causing no skin symptoms and is growing. PMHX NMSC.  Additionally, two lesions to left upper and lower leg.   Objective   Well appearing patient in no apparent distress; mood and affect are within normal limits.    A focused examination was performed including head, including the scalp, face, neck, nose, ears, eyelids, and lips and right and left lower extremities. All findings within normal limits unless otherwise noted below.    Right Anterior Neck  Stuck on verrucous, tan-brown papules and plaques.      Head - Anterior (Face), Neck - Anterior  Scattered tan macules in sun-exposed areas.    Left Lower Leg - Anterior, Left Thigh - Anterior  On lateral right ankle is a solitary 1.0cm pink, well-defined plaque with trailing scale and central clearing. Slightly pruritic, noticed 2 weeks ago, responsive to OTC antifungal. On right upper anterior leg is a solitary pink 1.0 cm well-defined patch with 8/10 pruritus, no trigger identified.       Assessment/Plan   Tinea corporis    Related Medications  ketoconazole (NIZOral) 2 % cream  Daily to affected areas on lower leg for 8 weeks    Dermatitis    Related Medications  triamcinolone (Kenalog) 0.1 % cream  Thin coat twice daily to affected areas on upper right leg for 1-2 weeks, then weekends only. Repeat every few months for flares.    Seborrheic keratosis  Right Anterior Neck    Although Seborrheic Keratoses can be troublesome and unsightly, they are entirely benign.  Removal of Seborrheic Keratoses is considered a cosmetic procedure. Removal is typically performed using liquid nitrogen cryotherapy.  Treatment of current lesions does not prevent the development of new Seborrheic Keratoses in the future.  -Discussed nature of  condition  -Multiple treatments in office are often needed  -Diligent at home therapy is often needed  -Cryotherapy today  -Possible side effects of liquid nitrogen treatment reviewed including formation of blisters, crusting, tenderness, scar, and discoloration which may be permanent.  -Patient advised to return the office for re-evaluation if the treated lesion(s) do not resolve within 4-6 weeks. Patient verbalizes understanding.      Lentigo (2)  Head - Anterior (Face); Neck - Anterior    A solar lentigo (plural, solar lentigines), sometimes called an age spot or liver spot, is a brown macule (small, flat, smooth area of skin) caused by chronic sun or artificial ultraviolet (UV) light exposure. There may be just one lentigo or there may be multiple. This type of lentigo is different from lentigo simplex (discussed separately) because it is caused by exposure to UV light. Solar lentigines are benign, but they do indicate excessive sun exposure, a risk factor for the development of skin cancer.  Lesions are benign, no treatment needed.     Rash and other nonspecific skin eruption  Left Thigh - Anterior; Left Lower Leg - Anterior    - Discussed the differential with Mr. Jimenez - tinea corporis, bug bite, contact derm, other or both.   - Start ketoconazole cream and triamcinolone cream (upper leg), use both as directed.   - Please call me if either area worsens or fails to resolve.   - Risks, benefits, and side effects discussed. Patient understood and agrees with the plan.       Follow-up with Dr. Leblanc as scheduled. Please call me if there are any changes or development of concerning symptoms (lesion/skin condition is changing, bleeding, enlarging, or worsening).

## 2024-10-04 ENCOUNTER — HOSPITAL ENCOUNTER (OUTPATIENT)
Dept: RADIOLOGY | Facility: CLINIC | Age: 54
Discharge: HOME | End: 2024-10-04
Payer: COMMERCIAL

## 2024-10-04 ENCOUNTER — OFFICE VISIT (OUTPATIENT)
Dept: ORTHOPEDIC SURGERY | Facility: CLINIC | Age: 54
End: 2024-10-04
Payer: COMMERCIAL

## 2024-10-04 DIAGNOSIS — M54.16 LUMBAR RADICULOPATHY: ICD-10-CM

## 2024-10-04 DIAGNOSIS — M41.9 SCOLIOSIS OF LUMBAR SPINE, UNSPECIFIED SCOLIOSIS TYPE: ICD-10-CM

## 2024-10-04 DIAGNOSIS — M54.16 LUMBAR RADICULOPATHY: Primary | ICD-10-CM

## 2024-10-04 PROCEDURE — 72110 X-RAY EXAM L-2 SPINE 4/>VWS: CPT

## 2024-10-04 PROCEDURE — 99214 OFFICE O/P EST MOD 30 MIN: CPT | Performed by: ORTHOPAEDIC SURGERY

## 2024-10-04 NOTE — PROGRESS NOTES
Macario Jimenez is a 53 y.o. male who presents for Follow-up of the Lower Back (Xray today).    HPI:  50-year-old gentleman here for follow-up of low back pain.  He denies any fever chills nausea vomiting night sweats.  He has no bowel or bladder complaints.    Physical exam:  Well-nourished, well kept.No lymphangitis or lymphadenopathy in the examined extremities.  Gait normal.  Can stand on heels and toes.   Examination of the back shows tenderness in the paraspinous musculature in the lumbosacral area.  There is decreased range of motion in all directions due to guarding/muscle spasms and pain at extremes.  There is good strength and no instability.  Examination of the lower extremities reveals no point tenderness, swelling, or deformity.  Range of motion of the hips, knees, and ankles are full without crepitance, instability, or exacerbation of pain.  Strength is 5/5 throughout.  No redness, abrasions, or lesions on extremities  Gross sensation intact in the extremities.  Deep tendon reflexes absent bilateral patella. Clonus negative.  Affect normal.  Alert and oriented ×3.  Coordination normal.    Imaging studies:  An MRI from November 2023 was reviewed today.    Assessment:  50-year-old gentleman last seen in October 2023 is here for follow-up of MRI.  He has had chronic history of back and left leg issues for several years, it really got worse over the summer 2024.  He is having low back pain 60% versus 40% left leg pain.  It goes down through the buttock the hamstring and the calf stopping at the ankle on the left.  He is also getting numbness and tingling from the knees down to the ankles bilaterally.  He did do several visits of physical therapy and it really did not help much.  He feels it may have made his legs a little stronger but did nothing for the pain.  He got into see Dr. Remy and had a full workup, he got several series of trigger point injections that did not help, he had an RFA that did not  give him any relief of his pain, however the simulated RFA with the injection gave him really good relief but the actual procedure did not.  He feels like he may be getting worse.  When he sits for the most part he is pain-free however that has start to become a problem.  Standing for any amount of time really triggers his pain.  He is also noticing when he lays in bed at night the pain flares up.    We have reviewed test today MRI.  His wife is with him acting as a helpful and necessary historian.  The note from Dr. Remy from August 2, 2024 was reviewed, this mentions his back pain.  This is an exacerbation of a chronic problem with progression of symptoms.  It is affecting his bodily function.    For complete plan and/or surgical details, please refer to Dr. Smith's portion of this split dictation.    -Benedict Tejeda PA-C    In a face-to-face encounter, I performed a history and physical examination, discussed pertinent diagnostic studies if indicated, and discussed diagnosis and management strategies with both the patient and the midlevel provider.  I reviewed the midlevel's note and agree with the documented findings and plan of care.    Patient known to me.  He has left leg radicular symptoms and back pain.  He has Wetzel thi placed when he was younger for thoracolumbar scoliosis.  He had an MRI done recently.  I do not see much going on except at L5-S1 there is a little bit of stenosis in the foramen on the left.  I am not sure if that is enough to give him the symptoms he is having.  On this sagittals his foramen does not look that tight at all.  On the axials I do see some but you can get fold on the axials given the scoliosis.  He does have an exacerbation of a chronic problem.  His significant other was a necessary and helpful historian today.  We have reviewed his MRI.  We reviewed Dr. Remy's notes that shows that he had good relief to an RFA lidocaine block but not good relief with the  actual procedure.  He is asking to see a different pain doctor today.  We are going to get him into one of my colleagues who specializes in adult deformity surgery.  We will get him into a different pain management doctor.  He can follow-up with me on a as needed basis.    Giovanni Smith MD  Orthopedic surgery

## 2024-10-10 DIAGNOSIS — L30.9 ACUTE DERMATITIS: Primary | ICD-10-CM

## 2024-10-10 DIAGNOSIS — L30.9 DERMATITIS: ICD-10-CM

## 2024-10-10 RX ORDER — TRIAMCINOLONE ACETONIDE 1 MG/G
CREAM TOPICAL
Qty: 240 G | Refills: 3 | Status: SHIPPED | OUTPATIENT
Start: 2024-10-10

## 2024-11-11 NOTE — PROGRESS NOTES
Mike FOR VISIT:  Virtual F/U for Crohn's disease    HPI:  Macario Jimenez is a 54 y.o. male who presents for follow-up of Crohn's ileocolitis (diagnosed 1986) s/p ileocecal resection in 1990.     Maintained on 5-ASA, 6MP, and Remicade 5 mg/kg (was off for approximately 1-2 years in early 2000s). 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 He then continued to do well on Remicade monotherapy. Previously on Asacol, Lialda, and Balsalazide.     1/2019 Anser IFX 2.2 with no antibodies on every 8 week in.fusions     12/2020 colonoscopy showed normal leopoldo-TI, normal anastomosis and quiescent colitis; one rectal polyp seen and removed. Polyp was inflammatory; biopsies with only mild chronic active colitis in the rectum; other biopsies WNL.     ANSER IFX 2/2021 just 1.7 with no antibodies. We decided to stop infliximab and last infliximab infusion 3/2021.      12/14/2021 Hydrogen Breath Test negative for SIBO     4/2023, symptoms of flare off meds with bleeding and frequency prior month along with oral ulcers and hand pain. Fecal Calprotectin 2270 ug/g. Did not respond to 5-ASA or topical therapy and needed a course of prednisone.     5/17/23 colonoscopy w/ some recurrent colitis involving the rectum and sigmoid that was relatively mild, but diffuse. He also had some recurrent ileitis with slightly worsened disease at the anastomosis, though no stenosis has developed. SES CD equals 8.  Started on Humira.     7/2023 improved on Humira, but still with some frequency and urgency. Humira level was subtherapeutic at 5.3. Fecal calprotectin remained elevated at greater than 1800. Moved to weekly Humira.     (+) mannose-binding lectin deficiency recently diagnosed; follows with Dr. Deshpande     9/2023 seen on weekly Humira about 3 weeks; still stooling  3-4 times a day. This is is 2-3 stools more than his normal. Still with some urgency and also with mucus leaking out. Using psyllium husk and helps form stool. If doesn't use psyllium, stool soupy. Felt  about 70% better. Of the 3-4 stools daily, 1-2 are formed stools and 1-2 are white mucus or loose stool. Overall, as long as on psyllium, stools are formed. Added sulfasalazine to see if any benefit.    10/2023 Humira level 18; FCP >1500.    12/2023 virtual follow-up was feeling quite well with 2-3 stools daily.  Bowel movements formed.  No mucus, no blood, no pain with bowel movements, or any urgency to move his bowels.  No IBD extraintestinal manifestations.    3/2024 FCP normal at 34.    In follow-up today, doing well on Humira weekly and sulfasalazine 1 gm BID.  Most of the time just takes 1 gm of sulfasalazine; maybe 1/3 of the time takes BID.  He is back to normal.  Feels like in full remission.  Formed stool once daily.  No urgency.  Watches his gluten intake; now that doing better, less strict.  Does not drink milk.  Mostly vegetarian.      Some chronic back issues.  This is his biggest issue over past year.  This is a chronic issue.  Some rashes; saw dermatology and improving with cream.      Some SSCP that he thinks is GERD related and associated with acid reflux.  He had this several years ago and was treated with omeprazole for a few weeks and it improved.  He did buy omeprazole 10 mg and didn't get symptom relief.  Happens most every day or at least 1/2 of the week.  He tries not to eat late or eat foods that exacerbate symptoms.  Non-exertional discomfort.        REVIEW OF SYSTEMS  Complete review of systems otherwise negative per complaint    No Known Allergies    Past Medical History:   Diagnosis Date    AB (asthmatic bronchitis) ([facility]) 08/04/2023    Abdominal pain, lower 08/04/2023    Chronic sinusitis, unspecified 04/02/2018    Recurrent sinusitis    Crohn's disease, unspecified, without  complications (Multi) 09/29/2021    Crohn's disease    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    Gilbert's syndrome    Iron deficiency anemia, unspecified 12/07/2013    Iron deficiency anemia    Low back pain 2018    Personal history of malignant neoplasm, unspecified     History of malignant neoplasm    Personal history of other diseases of the digestive system     History of gastroesophageal reflux (GERD)    Personal history of other diseases of the nervous system and sense organs     History of sleep apnea    Personal history of other diseases of the nervous system and sense organs 04/02/2018    History of eustachian tube dysfunction    Personal history of other diseases of the respiratory system 08/04/2017    History of acute bronchitis    Personal history of other diseases of the respiratory system 04/02/2018    History of laryngitis    Recurrent pansinusitis 08/04/2023    Unspecified asthma, uncomplicated ([facility]) 12/07/2013    Asthma       Past Surgical History:   Procedure Laterality Date    APPENDECTOMY  02/27/2015    Appendectomy    BACK SURGERY  1983    NASAL SEPTUM SURGERY  02/27/2015    Nasal Septal Deviation Repair    OTHER SURGICAL HISTORY  11/16/2018    Vertebral fusion    REFRACTIVE SURGERY  02/27/2015    Corneal LASIK    SINUS SURGERY  07/13/2021    Sinus Surgery    SMALL INTESTINE SURGERY  02/27/2015    Small Bowel Resection    SPINAL FUSION  1983       Current Outpatient Medications   Medication Sig Dispense Refill    Humira,CF, Pen 40 mg/0.4 mL pen injector kit pen-injector Inject 1 Pen (40 mg) under the skin 1 (one) time per week.      ketoconazole (NIZOral) 2 % cream Daily to affected areas on lower leg for 8 weeks 30 g 3    sulfaSALAzine (Azulfidine) 500 mg tablet Take 1 tablet (500 mg) by mouth 2 times a day. 180 tablet 1    triamcinolone (Kenalog) 0.1 % cream Thin coat twice daily to affected areas on  for 1-2 weeks, then weekends only. Repeat every few months for flares. 240 g 3     No current  facility-administered medications for this visit.       PHYSICAL EXAM:  /70   Pulse 88   Temp 36.2 °C (97.2 °F)   Resp 19   Wt 74.8 kg (164 lb 14.4 oz)   BMI 21.17 kg/m²   Vital signs reviewed  Patient alert and oriented in no acute distress  Anicteric; no oral lesions  No cervical adenopathy  Cardiac exam regular rate and rhythm S1-S2 without murmurs gallops or rubs  Lungs clear to auscultation bilaterally  Abdomen soft and nontender without organomegaly or mass.  No rebound or guarding.  Bowel sounds present  Extremities without edema       Lab Results   Component Value Date    WBC 5.4 08/08/2023    HGB 14.1 08/08/2023    HCT 42.0 08/08/2023    MCV 93 08/08/2023     08/08/2023     Lab Results   Component Value Date    ALT 8 (L) 08/04/2023    AST 12 08/04/2023    ALKPHOS 58 08/04/2023    BILITOT 1.0 08/04/2023       ASSESSMENT  #Crohn's disease-on weekly Humira and sulfasalazine 1 g daily or twice daily.  Feels well and like he is back in remission.  He is tolerating therapy without any side effects.  We will go ahead and see if we can slowly taper him off the sulfasalazine while continuing weekly Humira.  He is due for laboratory monitoring.      #GERD-he has some reflux symptoms.  We will put him on a 3-month course of proton pump inhibitor with pantoprazole 40 mg once daily in the morning on an empty stomach.  If symptoms return when he stops pantoprazole, he will let us know and we will decide about further evaluation and discuss further management options.  He prefers not to take medications.  We reviewed foods that can make reflux worse.  He may need an upper endoscopy at the time of his next colonoscopy based on whether symptoms persist.    PLAN  1) continue weekly Humira  2) lower sulfasalazine to 2 tablets once daily through the holiday season, then try stopping sulfasalazine.  If symptoms return, restart sulfasalazine 2 tablets twice daily  3) for reflux symptoms, begin pantoprazole 40  mg once daily in the morning on an empty stomach for 3 months and then stop.  If symptoms return after stopping pantoprazole please contact me  4) as long as you are doing well, follow-up in the office in approximately 1 year  5) check labsTB

## 2024-11-12 ENCOUNTER — OFFICE VISIT (OUTPATIENT)
Dept: GASTROENTEROLOGY | Facility: HOSPITAL | Age: 54
End: 2024-11-12
Payer: COMMERCIAL

## 2024-11-12 VITALS
BODY MASS INDEX: 21.17 KG/M2 | TEMPERATURE: 97.2 F | WEIGHT: 164.9 LBS | HEART RATE: 88 BPM | SYSTOLIC BLOOD PRESSURE: 102 MMHG | RESPIRATION RATE: 19 BRPM | DIASTOLIC BLOOD PRESSURE: 70 MMHG

## 2024-11-12 DIAGNOSIS — K21.9 GASTROESOPHAGEAL REFLUX DISEASE, UNSPECIFIED WHETHER ESOPHAGITIS PRESENT: ICD-10-CM

## 2024-11-12 DIAGNOSIS — K50.80 CROHN'S DISEASE OF BOTH SMALL AND LARGE INTESTINE WITHOUT COMPLICATIONS (MULTI): Primary | ICD-10-CM

## 2024-11-12 PROCEDURE — 99214 OFFICE O/P EST MOD 30 MIN: CPT | Performed by: INTERNAL MEDICINE

## 2024-11-12 RX ORDER — PANTOPRAZOLE SODIUM 40 MG/1
40 TABLET, DELAYED RELEASE ORAL DAILY
Qty: 30 TABLET | Refills: 2 | Status: SHIPPED | OUTPATIENT
Start: 2024-11-12 | End: 2025-02-10

## 2024-11-12 ASSESSMENT — PAIN SCALES - GENERAL: PAINLEVEL_OUTOF10: 0-NO PAIN

## 2024-11-12 NOTE — PATIENT INSTRUCTIONS
Thanks for coming in for follow-up.  It is great that you are doing so well on Humira therapy as we discussed today:    1) continue weekly Humira  2) lower sulfasalazine to 2 tablets once daily through the holiday season, then try stopping sulfasalazine.  If symptoms return, restart sulfasalazine 2 tablets twice daily  3) for reflux symptoms, begin pantoprazole 40 mg once daily in the morning on an empty stomach for 3 months and then stop.  If symptoms return after stopping pantoprazole please contact me  4) as long as you are doing well, follow-up in the office in approximately 1 year  5) check labs/TB

## 2024-12-03 ENCOUNTER — APPOINTMENT (OUTPATIENT)
Dept: PRIMARY CARE | Facility: CLINIC | Age: 54
End: 2024-12-03
Payer: COMMERCIAL

## 2024-12-03 ENCOUNTER — SPECIALTY PHARMACY (OUTPATIENT)
Dept: PHARMACY | Facility: CLINIC | Age: 54
End: 2024-12-03

## 2024-12-03 DIAGNOSIS — K50.80 CROHN'S DISEASE OF BOTH SMALL AND LARGE INTESTINE WITHOUT COMPLICATIONS (MULTI): Primary | ICD-10-CM

## 2024-12-03 RX ORDER — ADALIMUMAB 40MG/0.4ML
KIT SUBCUTANEOUS
Qty: 12 EACH | Refills: 3 | Status: SHIPPED | OUTPATIENT
Start: 2024-12-03

## 2024-12-10 ENCOUNTER — LAB (OUTPATIENT)
Dept: LAB | Facility: LAB | Age: 54
End: 2024-12-10
Payer: COMMERCIAL

## 2024-12-10 DIAGNOSIS — K50.80 CROHN'S DISEASE OF BOTH SMALL AND LARGE INTESTINE WITHOUT COMPLICATIONS (MULTI): ICD-10-CM

## 2024-12-10 LAB
BASOPHILS # BLD AUTO: 0.07 X10*3/UL (ref 0–0.1)
BASOPHILS NFR BLD AUTO: 1.3 %
EOSINOPHIL # BLD AUTO: 0.12 X10*3/UL (ref 0–0.7)
EOSINOPHIL NFR BLD AUTO: 2.3 %
ERYTHROCYTE [DISTWIDTH] IN BLOOD BY AUTOMATED COUNT: 12.2 % (ref 11.5–14.5)
HCT VFR BLD AUTO: 42 % (ref 41–52)
HGB BLD-MCNC: 14.3 G/DL (ref 13.5–17.5)
IMM GRANULOCYTES # BLD AUTO: 0.02 X10*3/UL (ref 0–0.7)
IMM GRANULOCYTES NFR BLD AUTO: 0.4 % (ref 0–0.9)
LYMPHOCYTES # BLD AUTO: 1.62 X10*3/UL (ref 1.2–4.8)
LYMPHOCYTES NFR BLD AUTO: 31.1 %
MCH RBC QN AUTO: 33.1 PG (ref 26–34)
MCHC RBC AUTO-ENTMCNC: 34 G/DL (ref 32–36)
MCV RBC AUTO: 97 FL (ref 80–100)
MONOCYTES # BLD AUTO: 0.58 X10*3/UL (ref 0.1–1)
MONOCYTES NFR BLD AUTO: 11.1 %
NEUTROPHILS # BLD AUTO: 2.8 X10*3/UL (ref 1.2–7.7)
NEUTROPHILS NFR BLD AUTO: 53.8 %
NRBC BLD-RTO: 0 /100 WBCS (ref 0–0)
PLATELET # BLD AUTO: 180 X10*3/UL (ref 150–450)
RBC # BLD AUTO: 4.32 X10*6/UL (ref 4.5–5.9)
WBC # BLD AUTO: 5.2 X10*3/UL (ref 4.4–11.3)

## 2024-12-10 PROCEDURE — 85025 COMPLETE CBC W/AUTO DIFF WBC: CPT

## 2024-12-10 PROCEDURE — 86481 TB AG RESPONSE T-CELL SUSP: CPT

## 2024-12-10 PROCEDURE — 86140 C-REACTIVE PROTEIN: CPT

## 2024-12-10 PROCEDURE — 36415 COLL VENOUS BLD VENIPUNCTURE: CPT

## 2024-12-10 PROCEDURE — 80076 HEPATIC FUNCTION PANEL: CPT

## 2024-12-11 ENCOUNTER — APPOINTMENT (OUTPATIENT)
Dept: PRIMARY CARE | Facility: CLINIC | Age: 54
End: 2024-12-11
Payer: COMMERCIAL

## 2024-12-11 VITALS
SYSTOLIC BLOOD PRESSURE: 124 MMHG | DIASTOLIC BLOOD PRESSURE: 68 MMHG | HEIGHT: 74 IN | TEMPERATURE: 97.3 F | BODY MASS INDEX: 21.43 KG/M2 | WEIGHT: 167 LBS

## 2024-12-11 DIAGNOSIS — R41.840 ATTENTION DISTURBANCE: ICD-10-CM

## 2024-12-11 DIAGNOSIS — Z12.5 PROSTATE CANCER SCREENING: ICD-10-CM

## 2024-12-11 DIAGNOSIS — H61.23 BILATERAL IMPACTED CERUMEN: ICD-10-CM

## 2024-12-11 DIAGNOSIS — K50.80 CROHN'S DISEASE OF BOTH SMALL AND LARGE INTESTINE WITHOUT COMPLICATIONS (MULTI): ICD-10-CM

## 2024-12-11 DIAGNOSIS — K21.9 GASTROESOPHAGEAL REFLUX DISEASE, UNSPECIFIED WHETHER ESOPHAGITIS PRESENT: ICD-10-CM

## 2024-12-11 DIAGNOSIS — Z00.00 WELLNESS EXAMINATION: Primary | ICD-10-CM

## 2024-12-11 LAB
ALBUMIN SERPL BCP-MCNC: 4.8 G/DL (ref 3.4–5)
ALP SERPL-CCNC: 64 U/L (ref 33–120)
ALT SERPL W P-5'-P-CCNC: 15 U/L (ref 10–52)
AST SERPL W P-5'-P-CCNC: 15 U/L (ref 9–39)
BILIRUB DIRECT SERPL-MCNC: 0.1 MG/DL (ref 0–0.3)
BILIRUB SERPL-MCNC: 0.7 MG/DL (ref 0–1.2)
CRP SERPL-MCNC: <0.1 MG/DL
PROT SERPL-MCNC: 7.7 G/DL (ref 6.4–8.2)

## 2024-12-11 PROCEDURE — 1036F TOBACCO NON-USER: CPT | Performed by: FAMILY MEDICINE

## 2024-12-11 PROCEDURE — 69210 REMOVE IMPACTED EAR WAX UNI: CPT | Performed by: FAMILY MEDICINE

## 2024-12-11 PROCEDURE — 3008F BODY MASS INDEX DOCD: CPT | Performed by: FAMILY MEDICINE

## 2024-12-11 PROCEDURE — 99396 PREV VISIT EST AGE 40-64: CPT | Performed by: FAMILY MEDICINE

## 2024-12-11 ASSESSMENT — PATIENT HEALTH QUESTIONNAIRE - PHQ9
1. LITTLE INTEREST OR PLEASURE IN DOING THINGS: NOT AT ALL
SUM OF ALL RESPONSES TO PHQ9 QUESTIONS 1 AND 2: 0
2. FEELING DOWN, DEPRESSED OR HOPELESS: NOT AT ALL

## 2024-12-11 NOTE — PROGRESS NOTES
"Chief complaint:   Chief Complaint   Patient presents with    Annual Exam     CPE       HPI:  Macario Jimenez is a 54 y.o. male who presents for a physical    He continues to have back pain, has had injection and ablation. Not improved much. He has appointment with neuosurgery: Dr. Thomas 1/30/2024. He had not been able to stand for more then 5 minutes at a time. He was unable to walk more then 1/2 block without having to sit down.     Occasionally has reflux sx especially when he eats to fast he can throw up.    ROS:  Constitutional:  Denies fevers, chills, night sweats  HEENT: Admits to tinnitus Denies change in vision, change in hearing, sore throat, rhinorrhea, congestion  Cardiovascular: Denies chest pain, SOB, racing heart, slow heart rate, palpitations, leg edema  Pulmonary: Denies cough, wheezing, SOB  Gastrointestinal: Admits to some reflux Denies abdominal pain, diarrhea, constipation, nausea, vomiting  Genitourinary: Denies dysuria, hematuria, incontinence, sexual dysfunction  Integumentary: Denies rash, new or changed skin lesions  Neuro: Admits to tingling in his legs mainly inside of the legs Denies headache, numbness  Musculoskeletal: Admits to back pain Denies myalgias, arthralgias  Psych: admits to difficulty with sleep denies change in mood  Heme: denies bruising or bleeding     Physical exam:  /68 (BP Location: Right arm, Patient Position: Sitting)   Temp 36.3 °C (97.3 °F)   Ht 1.88 m (6' 2\")   Wt 75.8 kg (167 lb)   BMI 21.44 kg/m²   General: NAD, well appearing male  Head: normocephalic  Ears: Cerumen partially obstructing left TM, right TM visible though cerumen present inferior canal, removed in office, TM normal bilaterally  Eyes: EOM intact, PERRLA  Nose: moist  Mouth: moist, good dentition  Heart: RRR, no murmur appreciated  Lungs: CTAB, no wheezes, rales, rhonchi  Abdomen: soft, non tender, no organomegaly  Psych: mood and affect congruent, alert and oriented  MSK: +5/5 gross " strength  Neuro: normal gait, sensation grossly intact  Skin: warm and dry    Ear Cerumen Removal    Date/Time: 12/11/2024 3:42 PM    Performed by: Jayla Escoto DO  Authorized by: Jayla Escoto DO    Consent:     Consent obtained:  Verbal    Consent given by:  Patient    Risks, benefits, and alternatives were discussed: yes      Risks discussed:  TM perforation, pain and incomplete removal    Alternatives discussed:  No treatment  Procedure details:     Location:  L ear (bilateral ears)    Procedure type: curette      Procedure outcomes: cerumen removed    Post-procedure details:     Inspection:  Ear canal clear    Hearing quality:  Normal    Procedure completion:  Tolerated       Assessment/Plan   Problem List Items Addressed This Visit       Crohn's disease of both small and large intestine without complications (Multi)     - continue care with GI  - currently on Humira and Sulfasalazine         GERD (gastroesophageal reflux disease)     - recently prescribed Pantoprazole by his GI provider, has not yet started         Wellness examination - Primary     - continue healthy diet and exercise habits and maintain ideal body weight  - Last Tdap 12/2020  - Last Prevnar 4/13/2023 (deficiency of anti-pneumococcal polysaccharide antibody)  - Shingrix vaccine series complete (8/24/2021 and 12/29/2021)  - fasting labs ordered, discussed USPTF recommendations D for prostate cancer screening but he is immunosuppressed and elects for screening  - follow up annually         Relevant Orders    PSA, total and free    Lipid panel    Basic metabolic panel     Other Visit Diagnoses       Prostate cancer screening        Relevant Orders    PSA, total and free    Attention disturbance        Relevant Orders    Referral to Psychiatry    Bilateral impacted cerumen                Jayla Escoto DO

## 2024-12-13 DIAGNOSIS — K50.80 CROHN'S DISEASE OF BOTH SMALL AND LARGE INTESTINE WITHOUT COMPLICATIONS (MULTI): ICD-10-CM

## 2024-12-13 RX ORDER — SULFASALAZINE 500 MG/1
500 TABLET ORAL 2 TIMES DAILY
Qty: 180 TABLET | Refills: 3 | Status: SHIPPED | OUTPATIENT
Start: 2024-12-13

## 2024-12-30 ENCOUNTER — LAB (OUTPATIENT)
Dept: LAB | Facility: LAB | Age: 54
End: 2024-12-30
Payer: COMMERCIAL

## 2024-12-30 DIAGNOSIS — Z12.5 PROSTATE CANCER SCREENING: ICD-10-CM

## 2024-12-30 DIAGNOSIS — Z00.00 WELLNESS EXAMINATION: ICD-10-CM

## 2024-12-30 LAB
ANION GAP SERPL CALC-SCNC: 13 MMOL/L (ref 10–20)
BUN SERPL-MCNC: 13 MG/DL (ref 6–23)
CALCIUM SERPL-MCNC: 9.9 MG/DL (ref 8.6–10.6)
CHLORIDE SERPL-SCNC: 101 MMOL/L (ref 98–107)
CHOLEST SERPL-MCNC: 196 MG/DL (ref 0–199)
CHOLESTEROL/HDL RATIO: 3.3
CO2 SERPL-SCNC: 31 MMOL/L (ref 21–32)
CREAT SERPL-MCNC: 0.92 MG/DL (ref 0.5–1.3)
EGFRCR SERPLBLD CKD-EPI 2021: >90 ML/MIN/1.73M*2
GLUCOSE SERPL-MCNC: 77 MG/DL (ref 74–99)
HDLC SERPL-MCNC: 60 MG/DL
LDLC SERPL CALC-MCNC: 124 MG/DL
NON HDL CHOLESTEROL: 136 MG/DL (ref 0–149)
POTASSIUM SERPL-SCNC: 4.9 MMOL/L (ref 3.5–5.3)
SODIUM SERPL-SCNC: 140 MMOL/L (ref 136–145)
TRIGL SERPL-MCNC: 62 MG/DL (ref 0–149)
VLDL: 12 MG/DL (ref 0–40)

## 2024-12-30 PROCEDURE — 80061 LIPID PANEL: CPT

## 2024-12-30 PROCEDURE — 36415 COLL VENOUS BLD VENIPUNCTURE: CPT

## 2024-12-30 PROCEDURE — G0103 PSA SCREENING: HCPCS

## 2024-12-30 PROCEDURE — 80048 BASIC METABOLIC PNL TOTAL CA: CPT

## 2024-12-30 PROCEDURE — 84154 ASSAY OF PSA FREE: CPT

## 2024-12-31 LAB
PSA FREE MFR SERPL: 6 %
PSA FREE SERPL-MCNC: 0.2 NG/ML
PSA SERPL IA-MCNC: 3.4 NG/ML (ref 0–4)

## 2025-01-02 ENCOUNTER — TELEPHONE (OUTPATIENT)
Dept: GASTROENTEROLOGY | Facility: HOSPITAL | Age: 55
End: 2025-01-02
Payer: COMMERCIAL

## 2025-01-30 ENCOUNTER — OFFICE VISIT (OUTPATIENT)
Facility: CLINIC | Age: 55
End: 2025-01-30
Payer: COMMERCIAL

## 2025-01-30 VITALS
SYSTOLIC BLOOD PRESSURE: 114 MMHG | BODY MASS INDEX: 22.17 KG/M2 | HEART RATE: 95 BPM | WEIGHT: 172.7 LBS | HEIGHT: 74 IN | TEMPERATURE: 97.3 F | DIASTOLIC BLOOD PRESSURE: 70 MMHG

## 2025-01-30 DIAGNOSIS — M53.3 SACROILIAC JOINT DYSFUNCTION: Primary | ICD-10-CM

## 2025-01-30 DIAGNOSIS — M54.16 LUMBAR RADICULOPATHY: ICD-10-CM

## 2025-01-30 DIAGNOSIS — M48.061 DEGENERATIVE LUMBAR SPINAL STENOSIS: ICD-10-CM

## 2025-01-30 DIAGNOSIS — M41.9 SCOLIOSIS OF LUMBAR SPINE, UNSPECIFIED SCOLIOSIS TYPE: ICD-10-CM

## 2025-01-30 PROCEDURE — 99204 OFFICE O/P NEW MOD 45 MIN: CPT | Performed by: NEUROLOGICAL SURGERY

## 2025-01-30 PROCEDURE — 1036F TOBACCO NON-USER: CPT | Performed by: NEUROLOGICAL SURGERY

## 2025-01-30 PROCEDURE — 3008F BODY MASS INDEX DOCD: CPT | Performed by: NEUROLOGICAL SURGERY

## 2025-01-30 PROCEDURE — 99214 OFFICE O/P EST MOD 30 MIN: CPT | Performed by: NEUROLOGICAL SURGERY

## 2025-01-30 ASSESSMENT — PATIENT HEALTH QUESTIONNAIRE - PHQ9
2. FEELING DOWN, DEPRESSED OR HOPELESS: NOT AT ALL
1. LITTLE INTEREST OR PLEASURE IN DOING THINGS: NOT AT ALL
SUM OF ALL RESPONSES TO PHQ9 QUESTIONS 1 & 2: 0

## 2025-01-30 ASSESSMENT — PAIN SCALES - GENERAL: PAINLEVEL_OUTOF10: 5

## 2025-01-30 NOTE — PROGRESS NOTES
[facility] [place]  Department of Neurological Surgery  New Patient Visit    History of Present Illness:  Macario Jimenez is a 54 y.o. year old male who presents to the spine clinic with chronic progressive low back and left lower extremity pain.  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  States over the last 3 years he has developed worsening low back pain.  This is worse with standing and activity.  He also describes radiating pain in the posterior aspect of his left thigh, typically stopping at the knee.  Prior treatments include physical therapy, epidural steroid injection, medial branch blocks, trigger point injections.  He is currently doing a insurance and recommended home exercise program.  He is referred to me by my colleague Dr. Giovanni Smith.  I reviewed his notes as well as the notes from his pain management specialist, Dr. Remy.    Prior spine surgeries: Yes  Prior physical therapy: Yes  Prior spinal injections: Yes    Diabetic: No  Osteoporosis: No  BMI: Body mass index is 22.17 kg/m².    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)    GERD (gastroesophageal reflux disease)    Insomnia    Low back pain    Nasal turbinate hypertrophy    Lumbar radicular pain    Nasal polyps    Obstructive sleep apnea of adult    Proctitis    Spider veins    Varicose veins of legs    Cancer of the skin, basal cell    Squamous cell cancer of skin of right shoulder    Scoliosis    Wellness examination     Past Medical History:   Diagnosis Date    AB (asthmatic bronchitis) ([facility]) 08/04/2023    Abdominal pain, lower 08/04/2023     Chronic sinusitis, unspecified 04/02/2018    Recurrent sinusitis    Crohn's disease, unspecified, without complications (Multi) 09/29/2021    Crohn's disease    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    Gilbert's syndrome    Iron deficiency anemia, unspecified 12/07/2013    Iron deficiency anemia    Low back pain 2018    Personal history of malignant neoplasm, unspecified     History of malignant neoplasm    Personal history of other diseases of the digestive system     History of gastroesophageal reflux (GERD)    Personal history of other diseases of the nervous system and sense organs     History of sleep apnea    Personal history of other diseases of the nervous system and sense organs 04/02/2018    History of eustachian tube dysfunction    Personal history of other diseases of the respiratory system 08/04/2017    History of acute bronchitis    Personal history of other diseases of the respiratory system 04/02/2018    History of laryngitis    Recurrent pansinusitis 08/04/2023    Unspecified asthma, uncomplicated ([facility]) 12/07/2013    Asthma     Past Surgical History:   Procedure Laterality Date    APPENDECTOMY  02/27/2015    Appendectomy    BACK SURGERY  1983    NASAL SEPTUM SURGERY  02/27/2015    Nasal Septal Deviation Repair    OTHER SURGICAL HISTORY  11/16/2018    Vertebral fusion    REFRACTIVE SURGERY  02/27/2015    Corneal LASIK    SINUS SURGERY  07/13/2021    Sinus Surgery    SMALL INTESTINE SURGERY  02/27/2015    Small Bowel Resection    SPINAL FUSION  1983     Social History     Tobacco Use    Smoking status: Never     Passive exposure: Never    Smokeless tobacco: Never   Substance Use Topics    Alcohol use: Not Currently     family history includes Cancer in his mother; Diabetes in his father; Hodgkin's lymphoma in his mother.    Current Outpatient Medications:     Humira,CF, Pen 40 mg/0.4 mL pen injector kit pen-injector, INJECT 40 MG (1 PEN) UNDER THE SKIN ONE TIME A WEEK, Disp: 12 each, Rfl: 3     ketoconazole (NIZOral) 2 % cream, Daily to affected areas on lower leg for 8 weeks, Disp: 30 g, Rfl: 3    pantoprazole (ProtoNix) 40 mg EC tablet, Take 1 tablet (40 mg) by mouth once daily. Do not crush, chew, or split., Disp: 30 tablet, Rfl: 2    sulfaSALAzine (Azulfidine) 500 mg tablet, TAKE 1 TABLET TWICE A DAY, Disp: 180 tablet, Rfl: 3    triamcinolone (Kenalog) 0.1 % cream, Thin coat twice daily to affected areas on  for 1-2 weeks, then weekends only. Repeat every few months for flares., Disp: 240 g, Rfl: 3  No Known Allergies    Physical Examination  General: well developed, awake/alert/oriented x3, no distress, alert and cooperative  Head/Neck: neck Supple, no apparent injury  ENMT: mucous membranes moist, no apparent injury, no lesions seen  Cardiovascular: no pitting edema, no JVD  Respiratory/Thorax: Normal breath sounds with good chest expansion, thorax symmetric  Skin: warm and dry, no lesions, no rashes    Neurological/Musculoskeletal:    - Posture: Thoracic dextroscoliosis, lumbar levoscoliosis    - Range of motion: Decreased lumbar range of motion    - Muscle Bulk: normal and symmetric in all extremities    -Tenderness palpation over the upper lumbar spine    - Motor Strength: 5/5 throughout all extremities    - Sensation: intact to light touch      Results  I personally reviewed and interpreted the imaging results, which included x-rays of the lumbar spine performed on October 4, 2024.  These show a lumbar levoscoliosis with the apex at L3.  This curve measures approximately 42 degrees.  His pelvic incidence is 38 degrees and his lumbar lordosis is 20 degrees.    I also reviewed and interpreted the CT of the chest performed on March 4, 2015 in regards to his thoracic spine.  This shows Wetzel thi fusion from T5-L2.  There is solid posterolateral fusion.    I also reviewed and interpreted an MRI of the lumbar spine performed on November 3, 2023.  This shows multilevel degenerative changes.  There  is moderate left foraminal and lateral recess stenosis at L5-S1.    Assessment and Plan:  Macario Jimenez is a 54 y.o. year old male who presents to the spine clinic with chronic progressive low back and left lower extremity pain.  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  States over the last 3 years he has developed worsening low back pain.  This is worse with standing and activity.  He also describes radiating pain in the posterior aspect of his left thigh, typically stopping at the knee.  Prior treatments include physical therapy, epidural steroid injection, medial branch blocks, trigger point injections.  He is currently doing a insurance and recommended home exercise program.  He is referred to me by my colleague Dr. Giovanni Smith.  I reviewed his notes as well as the notes from his pain management specialist, Dr. Remy.  His imaging shows a thoracic dextroscoliosis with solid fusion at T5 to L2.  He has a lumbar levoscoliosis with the apex at L3, measuring 42 degrees.  He has moderate left foraminal and lateral recess stenosis at L5-S1.  I had a lengthy discussion with the patient regarding their condition and treatment options. I discussed both further conservative care as well as different surgical approaches.  I told him that surgery to address his pathology would likely include an extension of his prior fusion down to the pelvis.  He certainly meets criteria from an imaging standpoint.  However, from a quality-of-life standpoint, I think we agree that he is not yet ready for deformity surgery.  At this time, I am going to refer the patient to physical therapy.  Told that would also be beneficial for him to follow-up with his pain management specialist about the potential for a sacroiliac joint injection.  I went to order EOS films to get a better sense of his overall spinal alignment.  I will plan to see the patient back in clinic in 6 months to assess his progress  and for ongoing treatment discussion.      I have reviewed all prior documentation and reviewed the electronic medical record since admission. I have personally have reviewed all advanced imaging not just the reports and used my interpretation as documented as the relevant findings. I have reviewed the risks and benefits of all treatment recommendations listed in this note with the patient and family.       The above clinical summary has been dictated with voice recognition software. It has not been proofread for grammatical errors, typographical mistakes, or other semantic inconsistencies.    Thank you for visiting our office today. It was our pleasure to take part in your healthcare.     Do not hesitate to call with any questions regarding your plan of care after leaving at [phone] M-F 8am-4pm.     To clinicians, thank you very much for this kind referral. It is a privilege to partner with you in the care of your patients. My office would be delighted to assist you with any further consultations or with questions regarding the plan of care outlined. Do not hesitate to call the office or contact me directly.       Sincerely,      Leighton Thomas MD PhD  Attending Neurosurgeon  [facility]   of Neurological Surgery  [employer]    [place] [id number]  [place] [address]    [facility]  [address]  [address]  [place] [address]    Office: [phone]  Fax: [phone]

## 2025-02-04 ENCOUNTER — HOSPITAL ENCOUNTER (OUTPATIENT)
Dept: RADIOLOGY | Facility: HOSPITAL | Age: 55
Discharge: HOME | End: 2025-02-04
Payer: COMMERCIAL

## 2025-02-04 DIAGNOSIS — M41.9 SCOLIOSIS OF LUMBAR SPINE, UNSPECIFIED SCOLIOSIS TYPE: ICD-10-CM

## 2025-02-04 PROCEDURE — 77073 BONE LENGTH STUDIES: CPT

## 2025-02-13 DIAGNOSIS — K21.9 GASTROESOPHAGEAL REFLUX DISEASE, UNSPECIFIED WHETHER ESOPHAGITIS PRESENT: ICD-10-CM

## 2025-02-13 DIAGNOSIS — K50.80 CROHN'S DISEASE OF BOTH SMALL AND LARGE INTESTINE WITHOUT COMPLICATIONS (MULTI): ICD-10-CM

## 2025-02-13 RX ORDER — PANTOPRAZOLE SODIUM 40 MG/1
TABLET, DELAYED RELEASE ORAL
Qty: 30 TABLET | Refills: 2 | Status: SHIPPED | OUTPATIENT
Start: 2025-02-13

## 2025-02-13 RX ORDER — ADALIMUMAB 40MG/0.4ML
40 KIT SUBCUTANEOUS WEEKLY
Qty: 12 EACH | Refills: 3 | Status: SHIPPED | OUTPATIENT
Start: 2025-02-13

## 2025-03-13 ENCOUNTER — APPOINTMENT (OUTPATIENT)
Dept: DERMATOLOGY | Facility: CLINIC | Age: 55
End: 2025-03-13
Payer: COMMERCIAL

## 2025-03-13 DIAGNOSIS — L57.0 ACTINIC KERATOSIS: ICD-10-CM

## 2025-03-13 DIAGNOSIS — L11.1 GROVER'S DISEASE: Primary | ICD-10-CM

## 2025-03-13 DIAGNOSIS — D22.9 MULTIPLE BENIGN NEVI: ICD-10-CM

## 2025-03-13 DIAGNOSIS — Z85.828 PERSONAL HISTORY OF SKIN CANCER: ICD-10-CM

## 2025-03-13 DIAGNOSIS — L73.8 SEBACEOUS HYPERPLASIA OF FACE: ICD-10-CM

## 2025-03-13 DIAGNOSIS — D18.01 HEMANGIOMA OF SKIN: ICD-10-CM

## 2025-03-13 DIAGNOSIS — Z85.828 PERSONAL HISTORY OF SQUAMOUS CELL CARCINOMA OF SKIN: ICD-10-CM

## 2025-03-13 DIAGNOSIS — L57.8 DIFFUSE PHOTODAMAGE OF SKIN: ICD-10-CM

## 2025-03-13 DIAGNOSIS — L82.1 SEBORRHEIC KERATOSIS: ICD-10-CM

## 2025-03-13 DIAGNOSIS — L81.4 LENTIGO: ICD-10-CM

## 2025-03-13 PROCEDURE — 99214 OFFICE O/P EST MOD 30 MIN: CPT | Performed by: STUDENT IN AN ORGANIZED HEALTH CARE EDUCATION/TRAINING PROGRAM

## 2025-03-13 PROCEDURE — 17000 DESTRUCT PREMALG LESION: CPT | Performed by: STUDENT IN AN ORGANIZED HEALTH CARE EDUCATION/TRAINING PROGRAM

## 2025-03-13 RX ORDER — TRIAMCINOLONE ACETONIDE 1 MG/ML
LOTION TOPICAL
Qty: 60 ML | Refills: 3 | Status: SHIPPED | OUTPATIENT
Start: 2025-03-13

## 2025-03-13 RX ORDER — TRETINOIN 0.25 MG/G
CREAM TOPICAL
Qty: 20 G | Refills: 5 | Status: SHIPPED | OUTPATIENT
Start: 2025-03-13

## 2025-03-13 NOTE — PROGRESS NOTES
Subjective   Macario Jimenez is a 54 y.o. male who presents for the following: Skin Check (Last FBSE: 3/5/24. No bleeding, changing, painful or itchy lesions today.)    Hx of Chrons. Current treatment: Humira (started 5/2023); Previously received Remicade infusions (stopped 3/2021)     Skin Cancer History  BCC - right shoulder 2017 (treated surgically)  SCC - right shoulder 2017 (treated surgically)  AK's     Family History of Skin Cancer  None    The following portions of the chart were reviewed this encounter and updated as appropriate:         Review of Systems: Pertinent items are noted in HPI.    Objective   Well appearing patient in no apparent distress; mood and affect are within normal limits.    A full examination was performed including scalp, head, eyes, ears, nose, lips, neck, chest, axillae, abdomen, back, buttocks, bilateral upper extremities, bilateral lower extremities, hands, feet, fingers, toes, fingernails, and toenails. All findings within normal limits unless otherwise noted below.    Well healed scar(s) at site(s) of prior treatment    Scattered cherry-red papule(s).    Scattered tan macules in sun-exposed areas.    Stuck on verrucous, tan-brown papules and plaques.      Scattered, uniform and benign-appearing, regular brown melanocytic papules and macules.    Abdomen (Lower Torso, Anterior), Chest (Upper Torso, Anterior), Torso - Posterior (Back)  Scattered erythematous erosions and crusted thin papules    Right Buccal Cheek  Erythematous macules with gritty scale.    Head - Anterior (Face)  Small yellow, lobulated papules with a central dell.      Assessment/Plan   Renan's disease (3)  Chest (Upper Torso, Anterior); Abdomen (Lower Torso, Anterior); Torso - Posterior (Back)    Grovers disease   Discussed nature of condition, likely related to chronic sun damage  Heat, occlusion, friction, pressure can flare  May try TAC 0.1% lotion 1-2x/day prn for flares    Related Medications  triamcinolone  (Kenalog) 0.1 % lotion  Apply to affected areas on body 1-2 times daily until clear.    Personal history of skin cancer    No evidence of recurrence at site(s) of prior treatment  Continue photoprotection with sun-protective clothing and sunscreen SPF 30+ daily  Continue routine self-skin examination  Continue to follow up yearly for routine FBSE or earlier prn for new/changing/concerning lesions       Related Procedures  Follow Up In Dermatology - Established Patient    Personal history of squamous cell carcinoma of skin    Related Procedures  Follow Up In Dermatology - Established Patient    Actinic keratosis  Right Buccal Cheek    Discussed precancerous nature of condition and relationship with sun-exposure.   Recommended treatment today with LN2. Discussed r/b of procedure including risk of pain, temporary redness, and dyspigmentation. Patient verbalized understanding and agreement with plan for treatment today.    Destr of lesion - Right Buccal Cheek  Complexity: simple    Destruction method: cryotherapy    Informed consent: discussed and consent obtained    Lesion destroyed using liquid nitrogen: Yes    Region frozen until ice ball extended beyond lesion: Yes    Cryotherapy cycles:  1  Outcome: patient tolerated procedure well with no complications    Post-procedure details: wound care instructions given      Hemangioma of skin    Reassured of benign nature of lesions    Lentigo    Reassured of benign nature of lesions    Seborrheic keratosis    Reassured of benign nature of lesions    Multiple benign nevi    Reassured of benign nature of lesions    Sebaceous hyperplasia of face  Head - Anterior (Face)    Reassured of benign nature of lesions    Diffuse photodamage of skin    Related Medications  tretinoin (Retin-A) 0.025 % cream  Apply a pea sized amount to face once nightly.    Continue to follow up yearly for routine FBSE or earlier prn for new/changing/concerning lesions     Scribe Attestation  By signing  my name below, I, Yessy Donovan LPN , Scribe   attest that this documentation has been prepared under the direction and in the presence of Noel Al MD.

## 2025-03-13 NOTE — PATIENT INSTRUCTIONS
"WHAT TO LOOK FOR: ABCDES OF MELANOMA:    A is for Asymmetry  One half of the spot is unlike the other half.    B is for Border  The spot has an irregular, scalloped, or poorly defined border.    C is for Color  The spot has varying colors from one area to the next, such as shades of tan, brown or black, or areas of white, red, or blue.    D is for Diameter  While melanomas are usually greater than 6 millimeters, or about the size of a pencil eraser, when diagnosed, they can be smaller.    E is for Evolving  The spot looks different from the rest or is changing in size, shape, or color.    SUNSCREEN AND SUN PROTECTION    Ultraviolet radiation from the sun is the main cause of skin cancer as well as sun damage (brown spots, wrinkles and more).  Your best protection from the sun is to stay out of the mid-day sun (from 10am-3pm), seek shade, and cover your skin with clothing and hats.  Wear a swim shirt when swimming.  Sunscreen should be used to areas that aren't covered, including lips.    We prefer sunscreens that are SPF 30 or higher.  Sunscreens should be applied liberally and reapplied every 2 hours, more often when swimming or sweating.    If you will be sweating or swimming, choose a sunscreen that is labeled \"Water resistant 80 minutes\".  This is the highest waterproof rating from the FDA.      Use a moisturizer with sunscreen daily to protect your sun-exposed areas such as the face, neck and backs of hands.  Some drugstore brands to try are Neutrogena Healthy Defense Daily Moisturizer (PureScreen) SPF 50 or CeraVe Face Lotion Invisible Zinc SPF 50.  Elta MD products are slightly more expensive and must be ordered through Amazon or the Elta website.  We like their UV Daily or UV Clear.      For body sunscreen when doing outdoor activity, some to try include Sun Bum products, Aveeno Baby Continuous Protection SPF 50 for sensitive skin, Blue Lizard SPF 30+, All Good sport sunscreen SPF 50, or Banana Boat Simply " Protect Sport Sunscreen lotion spf 50.  Sticks, gels, and sprays are also great and can be used for areas of the body that are difficult to cover with lotion.    If you have brown spots such as melasma or lentigenes, choose a tinted sunscreen.  There are ingredients in tinted sunscreens (iron oxide) that do a better job blocking certain types of light that cause brown spots.  We like Elta MD UV Clear tinted or Elta MD UV Daily tinted, which can be ordered on [employer] or from [employer]. You can also try Coola Mineral Face Matte Moisturizer SPF 30 or Australian Gold Botaniacal Suncreen SPF 50 Tinted Face Mineral Lotion.    There are two types of sunscreens: Chemical sunscreens, such as those that contain the ingredients avobenzone and oxybenzone, and Physical sunscreens, such as those that contain Zinc oxide and Titanium dioxide. Chemical sunscreens absorb light and absorb into the skin.  They must be applied 15 minutes before sun exposure.  Physical sunscreens reflect the light and are not absorbed into the skin.  They should be applied 5 minutes before sun exposure.  Some patients worry about the effects of sunscreens that are absorbed into the skin.  If you are worried about this, use the physical (zinc/titanium sunscreens)- look at the label before buying.  There is lots of scientific evidence that sunlight causes cancer, but there is no direct evidence that sunscreens are harmful.  However, the FDA has asked for further study of the chemical sunscreens to make sure they do not have any health effects on humans.

## 2025-03-17 ENCOUNTER — DOCUMENTATION (OUTPATIENT)
Dept: DERMATOLOGY | Facility: CLINIC | Age: 55
End: 2025-03-17
Payer: COMMERCIAL

## 2025-03-18 NOTE — PROGRESS NOTES
Received a fax from [employer] with denial for Tretinoin based on the diagnosis.  Pt notified.  Yessy Donovan LPN

## 2025-03-19 ENCOUNTER — APPOINTMENT (OUTPATIENT)
Dept: BEHAVIORAL HEALTH | Facility: CLINIC | Age: 55
End: 2025-03-19
Payer: COMMERCIAL

## 2025-03-19 DIAGNOSIS — Z13.39 ADHD (ATTENTION DEFICIT HYPERACTIVITY DISORDER) EVALUATION: ICD-10-CM

## 2025-03-19 DIAGNOSIS — R41.840 ATTENTION DISTURBANCE: ICD-10-CM

## 2025-03-19 PROCEDURE — 90791 PSYCH DIAGNOSTIC EVALUATION: CPT | Performed by: PSYCHOLOGIST

## 2025-03-19 NOTE — PROGRESS NOTES
11:00am to 11:45am  Met with client today for his diagnostic assessment via telehealth.  It should be noted that client was at home during the assessment.  Client is a 54-year-old heterosexual single male that goes by the pronouns he/him/his.  Client states that he has not been diagnosed with any mental health disorder, but presents today for an ADHD assessment.  Client states that he feels that he has had multiple symptoms that he has managed throughout his life.  Client states that the symptoms include forgetfulness, struggling to read, not completing task, hyper focusing, time blindness, OCD-like traits, getting sidetracked easily, having a difficult time with verbal instruction, sensitivity to smells sound texture and light, and losing things.  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  Client states that the symptoms have interfered with his work and home life.    Substance use:  Client denies any substance use.    Symptom checklist:  Client states that the following symptoms have been problematic in the last month: Issues staying asleep (client states that he has sleep apnea and uses a CPAP machine), irritability, socially withdrawn (due to back issues and not being able to stand or walk for long periods of time), pacing, psychomotor agitation (picking skin), poor concentration both at work and at home, short-term memory issues, thoughts of death or dying (about elderly mother and philosophical concepts of his own mortality), and obsessions with cleaning, things being in a certain order, and checking locks.    Prior mental health treatment:  Client denies having any prior mental health treatment or having any psychotropic medications prescribed to him.    Trauma history:  Client denies any trauma history.    Significant medical history:  Client states that he has Crohn's disease, sleep apnea, GERD, and back issues.  Client states that he weighs 165 pounds and that is his  usual weight.  Client denies being diagnosed with a eating disorder, but states that he does have issues with food texture.  Client states that he does have back issues that moderately interfere with his daily activities.  Client states that he does not take any medication for it.  Client states that he currently does not have a living will, power of , or DNR.    Family history:  Client states that he currently lives alone.  Client's father  in  of a pulmonary aneurysm during back surgery.  Client states that his mother is alive and suffers from various cancers.  Client states that he has a 59-year-old sister, and a 53-year-old sister that suffers from ADHD and possibly bipolar disorder.  Client states that he gets along with all family members.  Client states that his social support network consist of his family and his girlfriend.    Mental status exam:  Client is oriented x 4, appearance is appropriate, mood is in a normal range, affect is calm, speech is normal, thought content is normal, impulse control is good, judgment is good, and intellectual skills are average (client states that he was placed in the slow reading class in the third grade).  Client states that his learning preference is hands on.    Clinical impressions:  During the diagnostic assessment, client identified multiple autism traits.  Client was emailed an autism screener to complete to determine whether further testing is recommended.    Client also had a multiple ADHD traits so client will be tested for ADHD during the next session.

## 2025-04-01 ENCOUNTER — APPOINTMENT (OUTPATIENT)
Dept: BEHAVIORAL HEALTH | Facility: CLINIC | Age: 55
End: 2025-04-01
Payer: COMMERCIAL

## 2025-04-01 DIAGNOSIS — Z13.39 ADHD (ATTENTION DEFICIT HYPERACTIVITY DISORDER) EVALUATION: ICD-10-CM

## 2025-04-01 PROCEDURE — 90837 PSYTX W PT 60 MINUTES: CPT | Performed by: PSYCHOLOGIST

## 2025-04-01 PROCEDURE — 1036F TOBACCO NON-USER: CPT | Performed by: PSYCHOLOGIST

## 2025-04-15 ENCOUNTER — APPOINTMENT (OUTPATIENT)
Dept: BEHAVIORAL HEALTH | Facility: CLINIC | Age: 55
End: 2025-04-15
Payer: COMMERCIAL

## 2025-04-15 DIAGNOSIS — Z13.39 ADHD (ATTENTION DEFICIT HYPERACTIVITY DISORDER) EVALUATION: ICD-10-CM

## 2025-04-15 PROCEDURE — 1036F TOBACCO NON-USER: CPT | Performed by: PSYCHOLOGIST

## 2025-04-15 PROCEDURE — 90832 PSYTX W PT 30 MINUTES: CPT | Performed by: PSYCHOLOGIST

## 2025-04-15 NOTE — PROGRESS NOTES
9:00am to 9:17am  Met with client today for his individual session via Epic.  Client was given the results of his ADHD testing.  Client does not have ADHD. Recommended sleep testing. Client agreed.    Client's case is currently being closed as his assessment has been completed.

## 2025-04-18 ENCOUNTER — APPOINTMENT (OUTPATIENT)
Dept: OPHTHALMOLOGY | Facility: CLINIC | Age: 55
End: 2025-04-18
Payer: COMMERCIAL

## 2025-04-18 DIAGNOSIS — H25.812 COMBINED FORM OF AGE-RELATED CATARACT, LEFT EYE: Primary | ICD-10-CM

## 2025-04-18 DIAGNOSIS — H35.372 EPIRETINAL MEMBRANE (ERM) OF LEFT EYE: ICD-10-CM

## 2025-04-18 DIAGNOSIS — Z98.890 S/P LASIK (LASER ASSISTED IN SITU KERATOMILEUSIS): ICD-10-CM

## 2025-04-18 ASSESSMENT — REFRACTION_WEARINGRX
OD_SPHERE: PLANO
OD_ADD: +2.25
OD_CYLINDER: -1.25
SPECS_TYPE: PAL
OD_AXIS: 065
OS_SPHERE: -2.50
OS_ADD: +2.25
OS_CYLINDER: SPHERE

## 2025-04-18 ASSESSMENT — CONF VISUAL FIELD
OS_SUPERIOR_NASAL_RESTRICTION: 0
OS_INFERIOR_TEMPORAL_RESTRICTION: 0
OD_NORMAL: 1
OD_INFERIOR_TEMPORAL_RESTRICTION: 0
OD_INFERIOR_NASAL_RESTRICTION: 0
OS_NORMAL: 1
OD_SUPERIOR_NASAL_RESTRICTION: 0
OS_SUPERIOR_TEMPORAL_RESTRICTION: 0
OS_INFERIOR_NASAL_RESTRICTION: 0
OD_SUPERIOR_TEMPORAL_RESTRICTION: 0

## 2025-04-18 ASSESSMENT — REFRACTION_MANIFEST
OS_SPHERE: -3.00
OS_CYLINDER: -0.50
OD_CYLINDER: -1.00
METHOD_AUTOREFRACTION: 1
OS_AXIS: 085
OD_AXIS: 080
OD_SPHERE: -0.25

## 2025-04-18 ASSESSMENT — EXTERNAL EXAM - LEFT EYE: OS_EXAM: NORMAL

## 2025-04-18 ASSESSMENT — VISUAL ACUITY
METHOD: SNELLEN - LINEAR
OS_BAT_HIGH: 20/60
OD_CC: 20/25+1
OS_CC: 20/60+1
OS_CC: J1+
CORRECTION_TYPE: GLASSES
OD_CC: J1+
OD_BAT_HIGH: 20/25

## 2025-04-18 ASSESSMENT — EXTERNAL EXAM - RIGHT EYE: OD_EXAM: NORMAL

## 2025-04-18 ASSESSMENT — ENCOUNTER SYMPTOMS
EYES NEGATIVE: 1
ALLERGIC/IMMUNOLOGIC NEGATIVE: 1

## 2025-04-18 ASSESSMENT — SLIT LAMP EXAM - LIDS
COMMENTS: NORMAL
COMMENTS: NORMAL

## 2025-04-18 ASSESSMENT — TONOMETRY
OS_IOP_MMHG: 13
IOP_METHOD: GOLDMANN APPLANATION
OD_IOP_MMHG: 10

## 2025-04-18 ASSESSMENT — CUP TO DISC RATIO
OD_RATIO: 0.0
OS_RATIO: 0.0

## 2025-04-18 NOTE — PROGRESS NOTES
Assessment/Plan   Diagnoses and all orders for this visit:  Combined form of age-related cataract, left eye  Visually significant  SE OS is -2.5 and pt has natural monovision  The nature of cataract was discussed with the patient as well as the elective nature of surgery.  The patient was reassured that surgery at a later date does not put the patient at risk for a worse outcome.  It was emphasized that the need for surgery is dictated by the patient`s quality of life as influenced by the cataract.  All the patient`s questions were answered.     Pt would like to defer for now    Epiretinal membrane (ERM) of left eye  -     OCT, Retina - OU - Both Eyes  Mild ERM OS and minor disruption of IS-OS junction  Retina Next available     S/P LASIK (laser assisted in situ keratomileusis)  S/p myopic LASIK OU 1998    1 year for DFE

## 2025-05-09 ENCOUNTER — APPOINTMENT (OUTPATIENT)
Dept: OPHTHALMOLOGY | Facility: CLINIC | Age: 55
End: 2025-05-09
Payer: COMMERCIAL

## 2025-05-09 DIAGNOSIS — H35.372 EPIRETINAL MEMBRANE (ERM) OF LEFT EYE: Primary | ICD-10-CM

## 2025-05-09 ASSESSMENT — ENCOUNTER SYMPTOMS
ENDOCRINE NEGATIVE: 0
CARDIOVASCULAR NEGATIVE: 0
MUSCULOSKELETAL NEGATIVE: 0
NEUROLOGICAL NEGATIVE: 0
RESPIRATORY NEGATIVE: 0
HEMATOLOGIC/LYMPHATIC NEGATIVE: 0
ALLERGIC/IMMUNOLOGIC NEGATIVE: 0
CONSTITUTIONAL NEGATIVE: 0
PSYCHIATRIC NEGATIVE: 0
GASTROINTESTINAL NEGATIVE: 0
EYES NEGATIVE: 1

## 2025-05-09 ASSESSMENT — SLIT LAMP EXAM - LIDS
COMMENTS: NORMAL
COMMENTS: NORMAL

## 2025-05-09 ASSESSMENT — EXTERNAL EXAM - RIGHT EYE: OD_EXAM: NORMAL

## 2025-05-09 ASSESSMENT — CONF VISUAL FIELD
OS_SUPERIOR_NASAL_RESTRICTION: 0
OD_SUPERIOR_NASAL_RESTRICTION: 0
OD_INFERIOR_NASAL_RESTRICTION: 0
OS_NORMAL: 1
OD_NORMAL: 1
OS_SUPERIOR_TEMPORAL_RESTRICTION: 0
OD_SUPERIOR_TEMPORAL_RESTRICTION: 0
OS_INFERIOR_TEMPORAL_RESTRICTION: 0
OS_INFERIOR_NASAL_RESTRICTION: 0
OD_INFERIOR_TEMPORAL_RESTRICTION: 0

## 2025-05-09 ASSESSMENT — EXTERNAL EXAM - LEFT EYE: OS_EXAM: NORMAL

## 2025-05-09 ASSESSMENT — CUP TO DISC RATIO
OD_RATIO: 0.0
OS_RATIO: 0.0

## 2025-05-09 ASSESSMENT — TONOMETRY
OS_IOP_MMHG: 12
OD_IOP_MMHG: 12
IOP_METHOD: GOLDMANN APPLANATION

## 2025-05-09 ASSESSMENT — VISUAL ACUITY
OS_CC: 20/40
OD_CC: 20/25
METHOD: SNELLEN - LINEAR

## 2025-05-09 NOTE — PROGRESS NOTES
RPE mottling OU  Hx of sun gazing  Central disruption of EZ RPE OU    OCT 05/09/25      - Right eye (OD): Normal foveal contour, Central disruption of EZ RPE OU. No IRF or SRF    - Left eye (OS): Stage 1 ERM; Central disruption of EZ RPE OU. No IRF or SRF     Impression  Central disruption of EZ RPE OU; ddx solar retinopathy, healed prior WDS  No signs of active disease  Discussed guarded prognosis after CE  RTC in 6 months or sooner PRN    Epiretinal membrane (ERM) of left eyeH35.372  - No metamorphopsia or blurring of vision  - VA 20/25    #Plan#  - Trace  - Observe  - RTC in 6 month     Combined form of age-related cataract, left eye  Visually significant    S/P LASIK (laser assisted in situ keratomileusis)  S/p myopic LASIK OU 1998    1 year for DFE

## 2025-07-16 DIAGNOSIS — K50.80 CROHN'S DISEASE OF BOTH SMALL AND LARGE INTESTINE WITHOUT COMPLICATIONS (MULTI): Primary | ICD-10-CM

## 2025-07-16 RX ORDER — ADALIMUMAB-ADAZ 40 MG/.4ML
40 INJECTION, SOLUTION SUBCUTANEOUS
Qty: 1.6 ML | Refills: 6 | Status: SHIPPED | OUTPATIENT
Start: 2025-07-16

## 2025-07-17 ENCOUNTER — SPECIALTY PHARMACY (OUTPATIENT)
Dept: PHARMACY | Facility: CLINIC | Age: 55
End: 2025-07-17

## 2025-07-29 ENCOUNTER — OFFICE VISIT (OUTPATIENT)
Facility: CLINIC | Age: 55
End: 2025-07-29
Payer: COMMERCIAL

## 2025-07-29 VITALS
DIASTOLIC BLOOD PRESSURE: 70 MMHG | SYSTOLIC BLOOD PRESSURE: 110 MMHG | BODY MASS INDEX: 19.7 KG/M2 | HEART RATE: 55 BPM | TEMPERATURE: 97.7 F | HEIGHT: 74 IN | WEIGHT: 153.5 LBS

## 2025-07-29 DIAGNOSIS — M48.061 DEGENERATIVE LUMBAR SPINAL STENOSIS: ICD-10-CM

## 2025-07-29 DIAGNOSIS — M41.9 SCOLIOSIS OF LUMBAR SPINE, UNSPECIFIED SCOLIOSIS TYPE: ICD-10-CM

## 2025-07-29 DIAGNOSIS — M53.3 SACROILIAC JOINT DYSFUNCTION: ICD-10-CM

## 2025-07-29 DIAGNOSIS — M54.16 LUMBAR RADICULOPATHY: Primary | ICD-10-CM

## 2025-07-29 PROCEDURE — 99214 OFFICE O/P EST MOD 30 MIN: CPT | Performed by: NEUROLOGICAL SURGERY

## 2025-07-29 PROCEDURE — 99214 OFFICE O/P EST MOD 30 MIN: CPT | Mod: GC | Performed by: NEUROLOGICAL SURGERY

## 2025-07-29 PROCEDURE — 3008F BODY MASS INDEX DOCD: CPT | Performed by: NEUROLOGICAL SURGERY

## 2025-07-29 PROCEDURE — 1036F TOBACCO NON-USER: CPT | Performed by: NEUROLOGICAL SURGERY

## 2025-07-29 ASSESSMENT — PATIENT HEALTH QUESTIONNAIRE - PHQ9
1. LITTLE INTEREST OR PLEASURE IN DOING THINGS: NOT AT ALL
SUM OF ALL RESPONSES TO PHQ9 QUESTIONS 1 & 2: 0
2. FEELING DOWN, DEPRESSED OR HOPELESS: NOT AT ALL

## 2025-07-29 ASSESSMENT — PAIN SCALES - GENERAL: PAINLEVEL_OUTOF10: 0-NO PAIN

## 2025-07-29 NOTE — PROGRESS NOTES
[facility] [place]  Department of Neurological Surgery  Established Patient Visit    History of Present Illness  Macario Jimenez is a 54 y.o. 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 His imaging is notable for a 42 degree lumbar levoscoliosis with apex at L3 and a solid T5-L2 fusion and prior wetzel thi, and  left foraminal and lateral recess stenosis at L5-S1.    Since our last visit, he worked with a scoliosis specific PT and has started an intermittent fasting vegetarian diet and lost about 20 lbs. His low back pain and LLE radiculopathy have greatly improved. He is now able to walk about a mile and stand for about 30-60 min before starting to feel pain. No new neurologic symptoms.    BMI: Body mass index is 19.71 kg/m².    Denies any LOC, N/V, weakness, numbness/tingling, bowel/bladder incontinence     Problem List[1]  Medical History[2]  Surgical History[3]  Social History     Tobacco Use    Smoking status: Never     Passive exposure: Never    Smokeless tobacco: Never   Substance Use Topics    Alcohol use: Not Currently     family history includes Cancer in his mother; Diabetes in his father; Hodgkin's lymphoma in his mother; Scoliosis in his paternal grandmother.  Current Medications[4]  Allergies[5]    Physical Examination:  General: well developed, awake/alert/oriented x3, no distress, alert and cooperative  Head/Neck: neck Supple, no apparent injury  ENMT: mucous membranes moist, no apparent injury, no lesions seen  Cardiovascular: no pitting edema, no JVD  Respiratory/Thorax: Normal breath sounds with good chest expansion, thorax symmetric  Skin: warm and dry, no lesions, no rashes    Neurological/Musculoskeletal:    - Posture: normal coronal & sagittal alignment    - Range of motion: full active & passive range of motion    - Muscle Bulk: normal and  symmetric in all extremities    - Paraspinal muscle spasm/tenderness absent    - Motor Strength: 5/5 throughout all extremities    - Sensation: intact to light touch      Results:  I personally reviewed and interpreted the imaging results, which included EOS films performed on February 4, 2025.  These show a idiopathic sigmoid scoliosis.  There is a Wetzel thi extending from T4-L2.  There is a positive sagittal balance, but this is confounded by the fact that the patient appears to be holding onto a crossbar.    Assessment and Plan:  Macario Jimenez is a 54 y.o. 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 His imaging is notable for a 42 degree lumbar levoscoliosis with apex at L3 and a solid T5-L2 fusion and prior wetzel thi, and  left foraminal and lateral recess stenosis at L5-S1.    Since our last visit, he worked with a scoliosis specific PT and has started an intermittent fasting vegetarian diet and lost about 20 lbs. His low back pain and LLE radiculopathy have greatly improved.     As he has improved with PT and weight loss, recommend continuing this.     Patient can follow-up with me if his symptoms worsen or return.      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)    GERD (gastroesophageal reflux disease)    Insomnia    Low back pain    Nasal turbinate hypertrophy    Lumbar radicular pain     Nasal polyps    Obstructive sleep apnea of adult    Proctitis    Spider veins    Varicose veins of legs    Cancer of the skin, basal cell    Squamous cell cancer of skin of right shoulder    Scoliosis    Wellness examination    ADHD (attention deficit hyperactivity disorder) evaluation   [2]   Past Medical History:  Diagnosis Date    AB (asthmatic bronchitis) ([facility]) 08/04/2023    Abdominal pain, lower 08/04/2023    ADHD (attention deficit hyperactivity disorder) 2020    Chronic sinusitis, unspecified 04/02/2018    Recurrent sinusitis    Crohn's disease, unspecified, without complications (Multi) 09/29/2021    Crohn's disease    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    Gilbert's syndrome    Iron deficiency anemia, unspecified 12/07/2013    Iron deficiency anemia    Low back pain 2018    Personal history of malignant neoplasm, unspecified     History of malignant neoplasm    Personal history of other diseases of the digestive system     History of gastroesophageal reflux (GERD)    Personal history of other diseases of the nervous system and sense organs     History of sleep apnea    Personal history of other diseases of the nervous system and sense organs 04/02/2018    History of eustachian tube dysfunction    Personal history of other diseases of the respiratory system 08/04/2017    History of acute bronchitis    Personal history of other diseases of the respiratory system 04/02/2018    History of laryngitis    Recurrent pansinusitis 08/04/2023    Unspecified asthma, uncomplicated ([facility]) 12/07/2013    Asthma   [3]   Past Surgical History:  Procedure Laterality Date    APPENDECTOMY  02/27/2015    Appendectomy    BACK SURGERY  1983    LASIK      NASAL SEPTUM SURGERY  02/27/2015    Nasal Septal Deviation Repair    OTHER SURGICAL HISTORY  11/16/2018    Vertebral fusion    REFRACTIVE SURGERY  02/27/2015    Corneal LASIK    SINUS SURGERY  07/13/2021    Sinus Surgery    SMALL INTESTINE SURGERY  02/27/2015    Small Bowel  Resection    SPINAL FUSION  1983    THORACIC FUSION  1983   [4]   Current Outpatient Medications:     Hyrimoz,CF, Pen 40 mg/0.4 mL pen injector, Inject 0.4 mL (40 mg) under the skin every 7 days., Disp: 1.6 mL, Rfl: 6    ketoconazole (NIZOral) 2 % cream, Daily to affected areas on lower leg for 8 weeks, Disp: 30 g, Rfl: 3    sulfaSALAzine (Azulfidine) 500 mg tablet, TAKE 1 TABLET TWICE A DAY, Disp: 180 tablet, Rfl: 3    tretinoin (Retin-A) 0.025 % cream, Apply a pea sized amount to face once nightly., Disp: 20 g, Rfl: 5  [5] No Known Allergies

## 2025-07-31 ENCOUNTER — APPOINTMENT (OUTPATIENT)
Dept: OPHTHALMOLOGY | Facility: CLINIC | Age: 55
End: 2025-07-31
Payer: COMMERCIAL

## 2025-07-31 DIAGNOSIS — H35.372 EPIRETINAL MEMBRANE (ERM) OF LEFT EYE: ICD-10-CM

## 2025-07-31 DIAGNOSIS — H25.812 COMBINED FORM OF AGE-RELATED CATARACT, LEFT EYE: Primary | ICD-10-CM

## 2025-07-31 DIAGNOSIS — H04.123 DRY EYE SYNDROME, BILATERAL: ICD-10-CM

## 2025-07-31 DIAGNOSIS — Z98.890 S/P LASIK (LASER ASSISTED IN SITU KERATOMILEUSIS): ICD-10-CM

## 2025-07-31 PROCEDURE — 99214 OFFICE O/P EST MOD 30 MIN: CPT | Performed by: OPHTHALMOLOGY

## 2025-07-31 ASSESSMENT — VISUAL ACUITY
OS_CC+: +1
OS_CC: 20/60
OS_BAT_HIGH: 20/20
OD_BAT_HIGH: 20/20
METHOD: SNELLEN - LINEAR
OD_CC+: -1
OS_PH_CC: 20/20
OS_PH_CC+: -2
CORRECTION_TYPE: GLASSES
OD_CC: 20/20

## 2025-07-31 ASSESSMENT — CONF VISUAL FIELD
OD_SUPERIOR_NASAL_RESTRICTION: 0
OD_INFERIOR_NASAL_RESTRICTION: 0
OD_SUPERIOR_TEMPORAL_RESTRICTION: 0
METHOD: COUNTING FINGERS
OD_NORMAL: 1
OS_SUPERIOR_TEMPORAL_RESTRICTION: 0
OS_INFERIOR_NASAL_RESTRICTION: 0
OS_INFERIOR_TEMPORAL_RESTRICTION: 0
OD_INFERIOR_TEMPORAL_RESTRICTION: 0
OS_NORMAL: 1
OS_SUPERIOR_NASAL_RESTRICTION: 0

## 2025-07-31 ASSESSMENT — REFRACTION_WEARINGRX
OD_AXIS: 065
OS_SPHERE: -2.50
OS_CYLINDER: SPHERE
OD_ADD: +2.25
OS_ADD: +2.25
OD_SPHERE: PLANO
OD_CYLINDER: -1.25
SPECS_TYPE: PAL

## 2025-07-31 ASSESSMENT — ENCOUNTER SYMPTOMS
ALLERGIC/IMMUNOLOGIC NEGATIVE: 0
RESPIRATORY NEGATIVE: 0
CARDIOVASCULAR NEGATIVE: 0
GASTROINTESTINAL NEGATIVE: 0
HEMATOLOGIC/LYMPHATIC NEGATIVE: 0
EYES NEGATIVE: 1
CONSTITUTIONAL NEGATIVE: 0
NEUROLOGICAL NEGATIVE: 0
PSYCHIATRIC NEGATIVE: 0
ENDOCRINE NEGATIVE: 0
MUSCULOSKELETAL NEGATIVE: 0

## 2025-07-31 ASSESSMENT — TONOMETRY
OD_IOP_MMHG: 18/
OS_IOP_MMHG: 18
IOP_METHOD: GOLDMANN APPLANATION

## 2025-07-31 ASSESSMENT — CUP TO DISC RATIO
OD_RATIO: 0.0
OS_RATIO: 0.0

## 2025-07-31 ASSESSMENT — REFRACTION_MANIFEST
OS_CYLINDER: -0.50
OD_SPHERE: +0.75
OD_AXIS: 070
OS_AXIS: 100
OD_CYLINDER: -1.00
OS_SPHERE: -3.50
OD_ADD: +2.50
OS_ADD: +2.50

## 2025-07-31 ASSESSMENT — EXTERNAL EXAM - LEFT EYE: OS_EXAM: NORMAL

## 2025-07-31 ASSESSMENT — EXTERNAL EXAM - RIGHT EYE: OD_EXAM: NORMAL

## 2025-07-31 ASSESSMENT — SLIT LAMP EXAM - LIDS
COMMENTS: NORMAL
COMMENTS: NORMAL

## 2025-07-31 NOTE — PROGRESS NOTES
Assessment/Plan   Diagnoses and all orders for this visit:  Combined form of age-related cataract, left eye  Mildly Visually significant  SE OS is -2.5 and pt has natural monovision  The nature of cataract was discussed with the patient as well as the elective nature of surgery.  The patient was reassured that surgery at a later date does not put the patient at risk for a worse outcome.  It was emphasized that the need for surgery is dictated by the patient`s quality of life as influenced by the cataract.  All the patient`s questions were answered.     Agreed to defer for now    Epiretinal membrane (ERM) of left eye  Mild  Monitor    S/P LASIK (laser assisted in situ keratomileusis)  S/p myopic LASIK OU 1998    Dry eye syndrome  ATs    1 year for BAT and DFE

## 2025-08-08 DIAGNOSIS — K50.80 CROHN'S DISEASE OF BOTH SMALL AND LARGE INTESTINE WITHOUT COMPLICATIONS (MULTI): Primary | ICD-10-CM

## 2025-08-08 RX ORDER — ADALIMUMAB-ADBM 40MG/0.4ML
40 KIT SUBCUTANEOUS WEEKLY
Qty: 4 EACH | Refills: 5 | Status: SHIPPED | OUTPATIENT
Start: 2025-08-08

## 2025-12-12 ENCOUNTER — APPOINTMENT (OUTPATIENT)
Dept: OPHTHALMOLOGY | Facility: CLINIC | Age: 55
End: 2025-12-12
Payer: COMMERCIAL

## 2025-12-16 ENCOUNTER — APPOINTMENT (OUTPATIENT)
Dept: PRIMARY CARE | Facility: CLINIC | Age: 55
End: 2025-12-16
Payer: COMMERCIAL

## 2026-03-19 ENCOUNTER — APPOINTMENT (OUTPATIENT)
Dept: DERMATOLOGY | Facility: CLINIC | Age: 56
End: 2026-03-19
Payer: COMMERCIAL

## 2026-04-24 ENCOUNTER — APPOINTMENT (OUTPATIENT)
Dept: OPHTHALMOLOGY | Facility: CLINIC | Age: 56
End: 2026-04-24
Payer: COMMERCIAL

## 2026-08-31 ENCOUNTER — APPOINTMENT (OUTPATIENT)
Dept: OPHTHALMOLOGY | Facility: CLINIC | Age: 56
End: 2026-08-31
Payer: COMMERCIAL